# Patient Record
Sex: FEMALE | Race: WHITE | Employment: PART TIME | URBAN - METROPOLITAN AREA
[De-identification: names, ages, dates, MRNs, and addresses within clinical notes are randomized per-mention and may not be internally consistent; named-entity substitution may affect disease eponyms.]

---
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
## 2021-05-28 ENCOUNTER — HOSPITAL ENCOUNTER (OUTPATIENT)
Dept: GENERAL RADIOLOGY | Age: 21
Discharge: HOME | End: 2021-05-28
Payer: MEDICARE

## 2021-05-28 DIAGNOSIS — M79.641 HAND PAIN, RIGHT: ICD-10-CM

## 2021-05-28 PROCEDURE — 73130 X-RAY EXAM OF HAND: CPT

## 2021-06-24 ENCOUNTER — HOSPITAL ENCOUNTER (EMERGENCY)
Age: 21
Discharge: LEFT BEFORE DISCHARGE | End: 2021-06-24
Payer: MEDICARE

## 2021-06-24 VITALS
RESPIRATION RATE: 14 BRPM | OXYGEN SATURATION: 99 % | HEIGHT: 64 IN | TEMPERATURE: 97.6 F | BODY MASS INDEX: 20.14 KG/M2 | WEIGHT: 118 LBS | DIASTOLIC BLOOD PRESSURE: 91 MMHG | HEART RATE: 95 BPM | SYSTOLIC BLOOD PRESSURE: 140 MMHG

## 2021-06-24 LAB
BACTERIA: ABNORMAL /HPF
BILIRUBIN URINE: NEGATIVE
BLOOD, URINE: ABNORMAL
CLARITY: ABNORMAL
COLOR: YELLOW
EPITHELIAL CELLS, UA: ABNORMAL /HPF (ref 0–5)
GLUCOSE URINE: NEGATIVE MG/DL
HCG(URINE) PREGNANCY TEST: NEGATIVE
KETONES, URINE: NEGATIVE MG/DL
LEUKOCYTE ESTERASE, URINE: ABNORMAL
MICROSCOPIC EXAMINATION: YES
NITRITE, URINE: NEGATIVE
PH UA: 7 (ref 5–8)
PROTEIN UA: 100 MG/DL
RBC UA: ABNORMAL /HPF (ref 0–4)
SPECIFIC GRAVITY UA: 1.02 (ref 1–1.03)
URINE REFLEX TO CULTURE: YES
URINE TYPE: ABNORMAL
UROBILINOGEN, URINE: 1 E.U./DL
WBC UA: ABNORMAL /HPF (ref 0–5)

## 2021-06-24 PROCEDURE — 81001 URINALYSIS AUTO W/SCOPE: CPT

## 2021-06-24 PROCEDURE — 87186 SC STD MICRODIL/AGAR DIL: CPT

## 2021-06-24 PROCEDURE — 4500000002 HC ER NO CHARGE

## 2021-06-24 PROCEDURE — 84703 CHORIONIC GONADOTROPIN ASSAY: CPT

## 2021-06-24 PROCEDURE — 87086 URINE CULTURE/COLONY COUNT: CPT

## 2021-06-24 PROCEDURE — 87077 CULTURE AEROBIC IDENTIFY: CPT

## 2021-06-24 ASSESSMENT — PAIN SCALES - GENERAL: PAINLEVEL_OUTOF10: 5

## 2021-06-24 ASSESSMENT — PAIN DESCRIPTION - LOCATION: LOCATION: ABDOMEN

## 2021-06-24 ASSESSMENT — PAIN DESCRIPTION - DESCRIPTORS: DESCRIPTORS: SHARP

## 2021-06-24 ASSESSMENT — PAIN DESCRIPTION - ONSET: ONSET: GRADUAL

## 2021-06-24 ASSESSMENT — PAIN DESCRIPTION - ORIENTATION: ORIENTATION: LOWER

## 2021-06-24 ASSESSMENT — PAIN DESCRIPTION - PAIN TYPE: TYPE: ACUTE PAIN

## 2021-06-24 ASSESSMENT — PAIN DESCRIPTION - FREQUENCY: FREQUENCY: CONTINUOUS

## 2021-06-25 LAB
ORGANISM: ABNORMAL
URINE CULTURE, ROUTINE: ABNORMAL

## 2021-06-26 NOTE — RESULT ENCOUNTER NOTE
Culture reviewed, patient had left without being seen from the ED at [facility], please call and inform patient that her urine was positive for mild infection, recommend starting Macrobid 100 mg twice daily x7 days if she is having any urinary symptoms. Recommend following up with primary care.

## 2023-01-07 ENCOUNTER — APPOINTMENT (OUTPATIENT)
Dept: GENERAL RADIOLOGY | Age: 23
End: 2023-01-07
Payer: MEDICARE

## 2023-01-07 ENCOUNTER — HOSPITAL ENCOUNTER (EMERGENCY)
Age: 23
Discharge: HOME | End: 2023-01-08
Attending: STUDENT IN AN ORGANIZED HEALTH CARE EDUCATION/TRAINING PROGRAM
Payer: MEDICARE

## 2023-01-07 DIAGNOSIS — S61.412A LACERATION OF LEFT HAND WITHOUT FOREIGN BODY, INITIAL ENCOUNTER: ICD-10-CM

## 2023-01-07 DIAGNOSIS — F10.920 ACUTE ALCOHOLIC INTOXICATION WITHOUT COMPLICATION (HCC): ICD-10-CM

## 2023-01-07 DIAGNOSIS — Z72.89 DELIBERATE SELF-CUTTING: Primary | ICD-10-CM

## 2023-01-07 DIAGNOSIS — S61.421A LACERATION OF RIGHT HAND WITH FOREIGN BODY, INITIAL ENCOUNTER: ICD-10-CM

## 2023-01-07 LAB
A/G RATIO: 1.2 (ref 1.1–2.2)
ACETAMINOPHEN LEVEL: <5 UG/ML (ref 10–30)
ALBUMIN SERPL-MCNC: 4.3 G/DL (ref 3.4–5)
ALP BLD-CCNC: 77 U/L (ref 40–129)
ALT SERPL-CCNC: 16 U/L (ref 10–40)
AMPHETAMINE SCREEN, URINE: ABNORMAL
ANION GAP SERPL CALCULATED.3IONS-SCNC: 10 MMOL/L (ref 3–16)
AST SERPL-CCNC: 23 U/L (ref 15–37)
BARBITURATE SCREEN URINE: ABNORMAL
BASOPHILS ABSOLUTE: 0 K/UL (ref 0–0.2)
BASOPHILS RELATIVE PERCENT: 0.1 %
BENZODIAZEPINE SCREEN, URINE: ABNORMAL
BILIRUB SERPL-MCNC: 0.4 MG/DL (ref 0–1)
BUN BLDV-MCNC: 9 MG/DL (ref 7–20)
CALCIUM SERPL-MCNC: 8.1 MG/DL (ref 8.3–10.6)
CANNABINOID SCREEN URINE: POSITIVE
CHLORIDE BLD-SCNC: 103 MMOL/L (ref 99–110)
CO2: 27 MMOL/L (ref 21–32)
COCAINE METABOLITE SCREEN URINE: ABNORMAL
CREAT SERPL-MCNC: <0.5 MG/DL (ref 0.6–1.1)
EOSINOPHILS ABSOLUTE: 0 K/UL (ref 0–0.6)
EOSINOPHILS RELATIVE PERCENT: 0.4 %
ETHANOL: 201 MG/DL (ref 0–0.08)
ETHANOL: 352 MG/DL (ref 0–0.08)
FENTANYL SCREEN, URINE: ABNORMAL
GFR SERPL CREATININE-BSD FRML MDRD: >60 ML/MIN/{1.73_M2}
GLUCOSE BLD-MCNC: 98 MG/DL (ref 70–99)
HCG QUALITATIVE: NEGATIVE
HCT VFR BLD CALC: 43.7 % (ref 36–48)
HEMOGLOBIN: 14.4 G/DL (ref 12–16)
INFLUENZA A: NOT DETECTED
INFLUENZA B: NOT DETECTED
LYMPHOCYTES ABSOLUTE: 1.5 K/UL (ref 1–5.1)
LYMPHOCYTES RELATIVE PERCENT: 14.8 %
Lab: ABNORMAL
MAGNESIUM: 2.2 MG/DL (ref 1.8–2.4)
MCH RBC QN AUTO: 30.9 PG (ref 26–34)
MCHC RBC AUTO-ENTMCNC: 32.9 G/DL (ref 31–36)
MCV RBC AUTO: 93.8 FL (ref 80–100)
METHADONE SCREEN, URINE: ABNORMAL
MONOCYTES ABSOLUTE: 0.6 K/UL (ref 0–1.3)
MONOCYTES RELATIVE PERCENT: 6.2 %
NEUTROPHILS ABSOLUTE: 8.2 K/UL (ref 1.7–7.7)
NEUTROPHILS RELATIVE PERCENT: 78.5 %
OPIATE SCREEN URINE: ABNORMAL
OXYCODONE URINE: ABNORMAL
PDW BLD-RTO: 13.9 % (ref 12.4–15.4)
PH UA: 7
PHENCYCLIDINE SCREEN URINE: ABNORMAL
PLATELET # BLD: 292 K/UL (ref 135–450)
PMV BLD AUTO: 7.3 FL (ref 5–10.5)
POTASSIUM REFLEX MAGNESIUM: 3.5 MMOL/L (ref 3.5–5.1)
RBC # BLD: 4.66 M/UL (ref 4–5.2)
SALICYLATE, SERUM: 0.4 MG/DL (ref 15–30)
SARS-COV-2 RNA, RT PCR: NOT DETECTED
SODIUM BLD-SCNC: 140 MMOL/L (ref 136–145)
TOTAL PROTEIN: 7.9 G/DL (ref 6.4–8.2)
WBC # BLD: 10.4 K/UL (ref 4–11)

## 2023-01-07 PROCEDURE — 80143 DRUG ASSAY ACETAMINOPHEN: CPT

## 2023-01-07 PROCEDURE — 99284 EMERGENCY DEPT VISIT MOD MDM: CPT

## 2023-01-07 PROCEDURE — 84703 CHORIONIC GONADOTROPIN ASSAY: CPT

## 2023-01-07 PROCEDURE — 87636 SARSCOV2 & INF A&B AMP PRB: CPT

## 2023-01-07 PROCEDURE — 83735 ASSAY OF MAGNESIUM: CPT

## 2023-01-07 PROCEDURE — 73130 X-RAY EXAM OF HAND: CPT

## 2023-01-07 PROCEDURE — 82077 ASSAY SPEC XCP UR&BREATH IA: CPT

## 2023-01-07 PROCEDURE — 36415 COLL VENOUS BLD VENIPUNCTURE: CPT

## 2023-01-07 PROCEDURE — 80307 DRUG TEST PRSMV CHEM ANLYZR: CPT

## 2023-01-07 PROCEDURE — 80179 DRUG ASSAY SALICYLATE: CPT

## 2023-01-07 PROCEDURE — 80053 COMPREHEN METABOLIC PANEL: CPT

## 2023-01-07 PROCEDURE — 12001 RPR S/N/AX/GEN/TRNK 2.5CM/<: CPT

## 2023-01-07 PROCEDURE — 85025 COMPLETE CBC W/AUTO DIFF WBC: CPT

## 2023-01-07 RX ORDER — ESCITALOPRAM OXALATE 20 MG/1
TABLET ORAL
COMMUNITY
Start: 2022-12-12

## 2023-01-07 RX ORDER — ESCITALOPRAM OXALATE 10 MG/1
TABLET ORAL
COMMUNITY
Start: 2022-11-22

## 2023-01-07 RX ORDER — HYDROXYZINE HYDROCHLORIDE 10 MG/1
1 TABLET, FILM COATED ORAL
COMMUNITY

## 2023-01-07 NOTE — ED PROVIDER NOTES
[address] ED  EMERGENCY DEPARTMENT ENCOUNTER        Patient Name: Gage Lau  MRN: [id number]  [place] 2000  Date of evaluation: 1/7/2023  Provider: Froilan Nolasco MD  PCP: Igor Charles DO  Note Started: 2:16 AM EST 1/8/23        CHIEF COMPLAINT  Psychiatric Evaluation        HISTORY OF PRESENT ILLNESS  Gage Lau is a 25 y.o. female with a past medical history of cold sores, depression who presents to the ED for psychiatric evaluation. Was breaking glass objects in her home. Was drinking. Someone called EMS. Suicidal ideation: denies, but states that she was trying herself  Previous attempts: yes, OD  Homicidal ideation: denies  Access to firearms: denies  Audiovisual hallucinations: denies  Psychiatric medications: reports compliance  Tobacco use: yes  Alcohol use: occasional  Illicit drug use: denies    Somatic complaints: Patient has numerous small abrasions to the hands from smashing glass. She also has ecchymosis to the bilateral hands. Denies any numbness, weakness, tingling. Tetanus: 2016    Old records reviewed:  Patient saw outpatient [address] provider for anxiety and depression on 12/12/2022    No other complaints, modifying factors or associated symptoms. I have reviewed the following from the nursing documentation. Past Medical History:   Diagnosis Date    Chlamydia     during current pregnancy    Depression     Major depressive disorder- weaned off meds    Headache syndromes 3/3/2010    migraine     History reviewed. No pertinent surgical history. History reviewed. No pertinent family history.   Social History     Socioeconomic History    Marital status: Single     Spouse name: Not on file    Number of children: Not on file    Years of education: Not on file    Highest education level: Not on file   Occupational History    Not on file   Tobacco Use    Smoking status: Never    Smokeless tobacco: Not on file   Vaping Use    Vaping Use: Every day    Devices: Disposable   Substance and Sexual Activity    Alcohol use: Yes     Alcohol/week: 5.0 standard drinks     Types: 5 Shots of liquor per week     Comment: weekly,    Drug use: No    Sexual activity: Never   Other Topics Concern    Not on file   Social History Narrative    ** Merged History Encounter **          Social Determinants of Health     Financial Resource Strain: Not on file   Food Insecurity: Not on file   Transportation Needs: Not on file   Physical Activity: Not on file   Stress: Not on file   Social Connections: Not on file   Intimate Partner Violence: Not on file   Housing Stability: Not on file     Current Facility-Administered Medications   Medication Dose Route Frequency Provider Last Rate Last Admin    tetanus & diphtheria toxoids (adult) 5-2 LFU injection 0.5 mL  0.5 mL IntraMUSCular Prior to discharge Josue Gleason MD         Current Outpatient Medications   Medication Sig Dispense Refill    escitalopram (LEXAPRO) 10 MG tablet       escitalopram (LEXAPRO) 20 MG tablet       hydrOXYzine HCl (ATARAX) 10 MG tablet 1 tablet      ibuprofen (ADVIL;MOTRIN) 800 MG tablet Take 1 tablet by mouth every 6 hours as needed for Pain 30 tablet 0    Prenatal MV-Min-Fe Fum-FA-DHA (PRENATAL 1 PO) Take 1 tablet by mouth daily       No Known Allergies    REVIEW OF SYSTEMS  All systems reviewed, pertinent positives per HPI otherwise noted to be negative. PHYSICAL EXAM  BP (!) 137/106   Pulse (!) 113   Temp 98.8 °F (37.1 °C) (Oral)   Resp 16   SpO2 96%    GENERAL APPEARANCE: Awake and alert. Cooperative. HENT: Normocephalic. Atraumatic. Mucous membranes are moist  NECK: Supple. Full range of motion of the neck without stiffness or pain. EYES: PERRL. EOM's grossly intact. HEART/CHEST: RRR. No murmurs. LUNGS: Respirations unlabored. CTAB. Good air exchange. Speaking comfortably in full sentences. ABDOMEN: No tenderness. Soft. Non-distended. No masses. No organomegaly.  No guarding or rebound. MUSCULOSKELETAL: No extremity edema. Compartments soft. No deformity. No tenderness in the extremities. All extremities neurovascularly intact. Bilateral hands tender to palpation, cardinal hand movements intact. Intact sensation over the radial, ulnar, medial nerve distribution  SKIN: Warm and dry. Healed scratcher's to the bilateral forearms and thighs. Patient has scattered abrasions and small laceration to each hand. She also has scattered bruising. NEUROLOGICAL: Alert and oriented. No gross facial drooping. Strength 5/5, sensation intact. PSYCHIATRIC: Tearful, labile affect does not appear to be responding to internal stimuli. Reports thoughts of harming self, denies HI    LABS  I have reviewed all labs for this visit.    Results for orders placed or performed during the hospital encounter of 01/07/23   COVID-19 & Influenza Combo    Specimen: Nasopharyngeal Swab   Result Value Ref Range    SARS-CoV-2 RNA, RT PCR NOT DETECTED NOT DETECTED    INFLUENZA A NOT DETECTED NOT DETECTED    INFLUENZA B NOT DETECTED NOT DETECTED   CBC with Auto Differential   Result Value Ref Range    WBC 10.4 4.0 - 11.0 K/uL    RBC 4.66 4.00 - 5.20 M/uL    Hemoglobin 14.4 12.0 - 16.0 g/dL    Hematocrit 43.7 36.0 - 48.0 %    MCV 93.8 80.0 - 100.0 fL    MCH 30.9 26.0 - 34.0 pg    MCHC 32.9 31.0 - 36.0 g/dL    RDW 13.9 12.4 - 15.4 %    Platelets 096 289 - 339 K/uL    MPV 7.3 5.0 - 10.5 fL    Neutrophils % 78.5 %    Lymphocytes % 14.8 %    Monocytes % 6.2 %    Eosinophils % 0.4 %    Basophils % 0.1 %    Neutrophils Absolute 8.2 (H) 1.7 - 7.7 K/uL    Lymphocytes Absolute 1.5 1.0 - 5.1 K/uL    Monocytes Absolute 0.6 0.0 - 1.3 K/uL    Eosinophils Absolute 0.0 0.0 - 0.6 K/uL    Basophils Absolute 0.0 0.0 - 0.2 K/uL   CMP w/ Reflex to MG   Result Value Ref Range    Sodium 140 136 - 145 mmol/L    Potassium reflex Magnesium 3.5 3.5 - 5.1 mmol/L    Chloride 103 99 - 110 mmol/L    CO2 27 21 - 32 mmol/L    Anion Gap 10 3 - 16 Glucose 98 70 - 99 mg/dL    BUN 9 7 - 20 mg/dL    Creatinine <0.5 (L) 0.6 - 1.1 mg/dL    Est, Glom Filt Rate >60 >60    Calcium 8.1 (L) 8.3 - 10.6 mg/dL    Total Protein 7.9 6.4 - 8.2 g/dL    Albumin 4.3 3.4 - 5.0 g/dL    Albumin/Globulin Ratio 1.2 1.1 - 2.2    Total Bilirubin 0.4 0.0 - 1.0 mg/dL    Alkaline Phosphatase 77 40 - 129 U/L    ALT 16 10 - 40 U/L    AST 23 15 - 37 U/L   ETOH   Result Value Ref Range    Ethanol Lvl 352 mg/dL   HCG Qualitative, Serum   Result Value Ref Range    hCG Qual Negative Detects HCG level >10 MIU/mL   Acetaminophen Level   Result Value Ref Range    Acetaminophen Level <5 (L) 10 - 30 ug/mL   Salicylate   Result Value Ref Range    Salicylate, Serum 0.4 (L) 15.0 - 30.0 mg/dL   Drug screen multi urine   Result Value Ref Range    Amphetamine Screen, Urine Neg Negative <1000ng/mL    Barbiturate Screen, Ur Neg Negative <200 ng/mL    Benzodiazepine Screen, Urine Neg Negative <200 ng/mL    Cannabinoid Scrn, Ur POSITIVE (A) Negative <50 ng/mL    Cocaine Metabolite Screen, Urine Neg Negative <300 ng/mL    Opiate Scrn, Ur Neg Negative <300 ng/mL    PCP Screen, Urine Neg Negative <25 ng/mL    Methadone Screen, Urine Neg Negative <300 ng/mL    Oxycodone Urine Neg Negative <100 ng/ml    FENTANYL SCREEN, URINE Neg Negative <5 ng/mL    pH, UA 7.0     Drug Screen Comment: see below    Magnesium   Result Value Ref Range    Magnesium 2.20 1.80 - 2.40 mg/dL   Ethanol   Result Value Ref Range    Ethanol Lvl 201 mg/dL       RADIOLOGY  Non-plain film images such as CT, Ultrasound and MRI are read by the radiologist. Plain radiographic images are visualized and preliminarily interpreted by the ED Provider with the below findings:    Bilateral wrist show no evidence of fracture or dislocation.   X-ray of the right hand shows small linear foreign body over the thenar eminence    Interpretation per the Radiologist below, if available at the time of this note:    XR HAND RIGHT (MIN 3 VIEWS)   Final Result No acute fracture or dislocation. Tiny linear radiopaque foreign object is seen in the soft tissues overlying   the thumb. XR HAND LEFT (MIN 3 VIEWS)   Final Result   No acute osseous abnormality. No foreign body identified             Bedside Ultrasound, as interpreted by me, if performed:    No results found. PROCEDURES      Procedure Note - Laceration repair-right hand:  Questions were sought and answered and verbal consent was given by patient for the procedure. The area was prepped and draped in standard bedside fashion. The wound area was anesthetized with 1ml of Lidocaine 1% without epinephrine without added sodium bicarbonate. The wound was explored and cleaned further with No foreign bodies found. The wound was repaired with 4-0 Prolene; 1 simple interrupted sutures were used. The patient tolerated the procedure well without complications and my repeat neurovascular exam post-procedure is unchanged. Wound care and scar minimization education was provided. Instructions were given to return for increasing pain, redness, streaking, discharge, or any other worsening or worrisome concerns. Procedure Note - Laceration repair-left hand:  Questions were sought and answered and verbal consent was given by patient for the procedure. The area was prepped and draped in standard bedside fashion. The wound area was anesthetized with 1ml of Lidocaine 1% without epinephrine without added sodium bicarbonate. The wound was explored with No foreign bodies found. The wound was repaired with 4-0 Prolene; 1 simple interrupted sutures were used. The patient tolerated the procedure well without complications and my repeat neurovascular exam post-procedure is unchanged. Wound care and scar minimization education was provided. Instructions were given to return for increasing pain, redness, streaking, discharge, or any other worsening or worrisome concerns.        EMERGENCY DEPARTMENT COURSE and DIFFERENTIAL DIAGNOSIS/MDM:   Patient seen and evaluated. Old records reviewed and pertinent information included in HPI. Labs and imaging reviewed and results discussed with patient. Overall tearful appearing patient presenting for alcohol intoxication and self-harm behavior. History obtained from patient. Physical exam remarkable for numerous shallow lacerations to the bilateral hands with ecchymoses. Patient also has evidence of deliberate self cutting. Patient's pertinent external medical records: Records Reviewed : Outpatient Notes patient discussed anxiety and depression with outpatient [address] [place] [address] provider on 12/12/2022    Chronic Medical Conditions that may contribute to presentation today:  has a past medical history of Chlamydia, Depression, and Headache syndromes (3/3/2010). Social Determinants affecting Dx or Tx: Social Determinants : None;   Social History     Socioeconomic History    Marital status: Single     Spouse name: Not on file    Number of children: Not on file    Years of education: Not on file    Highest education level: Not on file   Occupational History    Not on file   Tobacco Use    Smoking status: Never    Smokeless tobacco: Not on file   Vaping Use    Vaping Use: Every day    Devices: Disposable   Substance and Sexual Activity    Alcohol use:  Yes     Alcohol/week: 5.0 standard drinks     Types: 5 Shots of liquor per week     Comment: weekly,    Drug use: No    Sexual activity: Never   Other Topics Concern    Not on file   Social History Narrative    ** Merged History Encounter **          Social Determinants of Health     Financial Resource Strain: Not on file   Food Insecurity: Not on file   Transportation Needs: Not on file   Physical Activity: Not on file   Stress: Not on file   Social Connections: Not on file   Intimate Partner Violence: Not on file   Housing Stability: Not on file         WORKUP INTERPRETATION:  ED Course as of 01/08/23 0224   Sat Jan 07, 2023 1759 Ethanol level significantly elevated at 352 [ER]   1800 No leukocytosis, anemia, thrombocytopenia [ER]   1800 No electrolyte abnormalities or evidence of kidney dysfunction [ER]   1800 Liver function testing unremarkable [ER]   1819 Patient is not pregnant [ER]   1819 Urine drug screen positive for cannabinoids, otherwise unremarkable [ER]   1825 COVID and flu swab negative [ER]   1909 Wounds were cleaned, laceration on bilateral hands were. [ER]   2102 Machine to complete acetaminophen and salicylate testing is down at [facility]. Blood sample has been sent to [facility] for testing [ER]   2210 Acetaminophen and salicylate level negative [ER]   2306 Repeat ethanol level is 201. [ER]   2307 Patient is medically clear pending legal sobriety for psychiatric evaluation. [ER]      ED Course User Index  [ER] Lydia Herron MD      Hand x-ray showed no evidence of fracture or dislocation. Did show small linear foreign body in the right hand which I did attempt to clean further to remove prior to laceration repair. Patient will get tetanus update. CONSULTS:   Patient is pending psychiatry social work consultation after sobriety obtained    Is this patient to be included in the SEP-1 Core Measure due to severe sepsis or septic shock? No   Exclusion criteria - the patient is NOT to be included for SEP-1 Core Measure due to:  2+ SIRS criteria are not met      TREATMENT:  During the patient's ED course, the patient was given:  Medications   tetanus & diphtheria toxoids (adult) 5-2 LFU injection 0.5 mL (has no administration in time range)        REASSESSMENT:  On reassessment, patient is calm and cooperative. Patient is aware of plan to achieve legal sobriety prior to psychiatric evaluation. Patient is aware she is on a psychiatric hold and will be evaluated by psychiatry after legal sobriety is obtained. I have performed a medical clearance examination on this patient.   Patient is still pending legal sobriety. Otherwise, it is my opinion that no medical conditions were discovered that would preclude admission to a behavioral health unit or discharge home. I feel that the patient is medically stable for disposition by the behavioral health team at this time. Blood pressure 116/81, pulse 93, temperature 98.7 °F (37.1 °C), temperature source Temporal, resp. rate 16, SpO2 97 %, unknown if currently breastfeeding. FINAL IMPRESSION      1. Deliberate self-cutting    2. Laceration of right hand with foreign body, initial encounter    3. Laceration of left hand without foreign body, initial encounter    4. Acute alcoholic intoxication without complication [facility])          DISPOSITION/PLAN   Disposition: [address] 01/07/2023 10:39:48 PM    Condition: stable       11:48 PM   I have signed out [facility] Emergency Department care to Dr. Jacque Valdovinos. Patient is medically cleared except for legal sobriety to allow for psychiatric evaluation. We discussed the pertinent history, physical exam, completed/pending test results (if applicable) and current treatment plan. Please refer to his/her chart for the patients remaining Emergency Department course and final disposition. I, Dr. Justina Craig, am the primary clinician of record.          Jefferson Morocho MD  01/08/23 Sunny Carlton

## 2023-01-07 NOTE — ED NOTES
Pt to bathroom in [facility] via . RN provided pt c specimen cup. Pt returned cup to RN c sample that was clear and less than lukewarm. DANIEL Jacob RN  01/07/23 [id number]

## 2023-01-08 VITALS
RESPIRATION RATE: 18 BRPM | SYSTOLIC BLOOD PRESSURE: 134 MMHG | HEART RATE: 68 BPM | DIASTOLIC BLOOD PRESSURE: 92 MMHG | OXYGEN SATURATION: 99 % | TEMPERATURE: 98.2 F

## 2023-01-08 LAB — ETHANOL: NORMAL MG/DL (ref 0–0.08)

## 2023-01-08 PROCEDURE — 82077 ASSAY SPEC XCP UR&BREATH IA: CPT

## 2023-01-08 PROCEDURE — 36415 COLL VENOUS BLD VENIPUNCTURE: CPT

## 2023-01-08 NOTE — ED NOTES
Patient resting calmly in treatment room. Respirations easy and even. No signs of distress noted. Will continue to monitor for safety.       Sunil Jackson RN  01/08/23 0144

## 2023-01-08 NOTE — ED NOTES
Patient is currently in treatment room resting. No signs of distress noted. Will continue to monitor for safety.       Ravinder Paci  01/08/23 [id number]

## 2023-01-08 NOTE — DISCHARGE INSTRUCTIONS
The National Suicide and Crisis Hotline Number is 65. You can call, chat, or text this number at any time to access emergency mental health services. The crisis number for [facility] is 894-8750 (SAVE). This crisis line is available 24 hours a day, seven days a week. You are being referred for back to outpatient treatment at [facility] ([facility]). [facility] has offices located in [facility], and [place]. You can reach them at [phone] (Bety/Chris) or [phone] Robi Grewal)    When you go, please bring a photo i.d., proof of residence, proof of household income, insurance cards (if you have them), and this paperwork. Detox Only- (Treatment should be coordinated prior)    1400 [facility]. Josef Cutler 48    Detox Included in [address] [place] [address]. Alis Hull 135, 1648 [facility] 105 Medicaid, will take [facility]. residents on a fee-only basis if no insurance  Inpatient detox for men and women  [facility]- [phone]  ext. [address] [place] [address] Dr  Accepts Medicaid  Inpatient detox for men and women  [facility] [phone]  [address]  Omayra, [address] [facility]  Private and Medicaid  Ambulatory Detox for men  The 83 [facility] [place]- 911 [facility], [place], [facility] [address]  Private Insurance only  Inpatient detox  [place]- [phone]  Nicole Sims, 800 [facility]  Private insurance only, although will accept those with Medicaid aged 18-20  Inpatient detox  [facility]- [phone], [phone] Admissions  555 N [facility] [place] Darrian Sims, [address]  Most forms of Medicaid accepted, plus private insurances  Inpatient detox  The [address] [place] [address]- [phone] or 701 [facility], [facility] [address]  Most insurance accepted and self-pay rates available  Inpatient detox  Chesnee Ehxkjvrs-[phone]  725 [facility] Carmita Morin [facility] 80  Medicaid accepted  Dual diagnosis treatment with medication management  Pregnant women accepted  Provides transportation to facility   Inpatient detox        Pr-[address] 1785- [phone]  1025 [facility] [address], [address]  Accepts Medicaid and other forms of insurance  [address] [facility] [address]  1250 [facility], [address]  Accepts Medicaid and other forms of insurance  [facility] [phone]  [address] [facility] [address]  George Gilbert, [address] [facility] [address]  Accepts Medicaid and other forms of insurance  Karine do Guzmano- [phone] , [phone]  Josesneha 9, 720 [facility]  Accepts Medicaid and other forms of insurance  [facility] 99- [phone]  117 [facility], 103 [facility]  Accepts Medicaid and other forms of insurance- can bring your children  Boris Cutler, 4648 [facility]  Takes Medicaid and Jerlean Jewels. residents on a fee-only scale  Surveyor- [phone]  Sarahi Whitman, [address]  Private insurance only, although will accept those with Medicaid aged 18-20  The [address]  Accepts Medicaid and other forms of insurance- can bring your children  The 83 [facility] Road- [phone], [phone], [phone]  [facility]5 [address]  Most insurances accepted  [facility]- [phone], [phone] Admissions  555 N \[facility]"" Librado Whitman, [address]  Most forms of Medicaid accepted, plus [address]- [phone]  [facility] MID-NOVEMBER 2019)  [address] [place] [address] [address], Marke, [facility] Dr  Some Medicaid and most private insurances accepted  [facility]- [phone], 16501 [facility]. Tomás [facility], [facility]  Medicare and most private insurances accepted - Mental health w/ co-occuring KEM  [employer] [place]  [address], [address]- [phone] [address],Bora RICE, Justyna, 205 [facility]  No cost, work program, gwen based        [address] - [phone]  [address] MujicaRiverview Medical Centerrogen 55  Accepts Medicaid and other forms of insurance  [place]- [phone]  3136 S [facility]. [place], [address] [facility] [address]  Accepts Medicaid and other forms of insurance  [facility]- [phone]  4011 S [facility].  Isa, [address] [facility] [address]  Accepts Medicaid and other forms of insurance  Karine do Guzmano- [phone] , [phone]  Genjaydensneha 9, 720 [facility]  Accepts Medicaid and other forms of insurance  Kyle uCtler, 1758 [facility]  Takes Medicaid and [facility] General Hospitalist. residents on a fee-only scale  The Cedric B 1711- [phone]  [facility] SPECIALTY [facility] [place] [facility] Life and Recovery-  [phone] [place] [phone]  Gwen based and accepts [facility]  Recovery Works-  [phone]  41 [facility] [address], 1025 [facility]  Medicaid and Private insurance accepted  [place]- [phone]  Marques Gutierrez, [address]  Private insurance only, although will accept those with Medicaid aged 20-19  The 83 [facility] [address]- [phone], [phone], [phone]  [facility]5 [facility], [place], [address] [place] [address]  Most insurances accepted  [facility]- [phone], [phone] Admissions  555 N \[facility]\"" Jr Rojas. [address]  Most forms of Medicaid accepted, plus 300 [facility] [address]- [phone]  [facility] MID-NOVEMBER 2019)  79[address] [place] 153, [place], [facility]   Some Medicaid and most private insurances accepted  [facility]- [phone], [address] [place] [address] Alberto England 107, [address]  Medicare and most private insurances accepted - Mental health w/ co-occuring KEM  [facility]- [phone]  [facility]. Dora Cutler [facility] 119  No cost, work program, gwen based  [facility]  [phone]  49 S. 4050 [facility], 1001 [facility]   Gwen based and free  15 Eliza [facility], [address] Based and free       [facility] [phone]  [address]. Lata Copping [address], [address] [place] [address]  Men and women  [facility]endorf [facility] [phone]  1500 [facility]. Beatriz, [facility]  Men and women  [place]- [phone]  1650 [facility]. Jr Thornton. [address] 21  Men and women  [facility]- [phone]  (17794 [facility] 285 2019)  [address] [address], [facility], [address] [place] [address] Dr  Some Medicaid and most private insurances accepted  [facility]- [phone], [phone] Direct Cell  2000 [facility], [facility]  Medicare and most private insurances accepted - Mental health w/ co-occuring KEM  The 83 [facility] [address]- [address]  Dual-diagnosis, men and women  [facility] By Derrick - [phone]  2540 [facility]. [place], [place]. [address]  Men and Women      Outpatient Only    H[facility]P [facility]- [phone]  Yumiko 9 [place], [facility]  Accepts Medicaid and other forms of insurance  [facility]- [phone]  [address]. Lata Copping 120  [facility], [address] [place] [address]  Accepts Medicaid and other forms of insurance, does ambulatory detox  [place]- [phone]  [facility] 473, [place], 66 Tamika [facility] private insurance, [place], assists with Jennie 22- [phone]  ( will guide)  [address] [place] [address].  [address] [place] [address]  Accepts Medicaid and private insurance  Vasiliy Srinivasan [facility] [phone] [address] Franc[address]63, [address]  Private insurance only at this time  [address]  [address] [place], 1648 Audrey Iniguez  Takes Medicaid and Rach Churn. residents on a fee-only scale  [facility] - [phone]  692 S. 309 [facility], 333 [facility] [facility]  1901 Southwest . NISHI. [address] 206 [facility] [address] [facility] [phone]  181 [facility], [place] [address]  Accepts Medicaid, serves T Botanica [employer] and [employer] residents on a sliding fee scale  [facility] [phone]  [address]. Horace García [address]  Tamika Hernandez [address], 727 [facility]  No Medicaid, No Carol Merl or SANDNES, all other private insurances accepted  [facility]- [phone]  [address] Vang Erlinda, [place]  Private insurance only  [place] - [phone]  Mariela [address]. 206 [facility] [address]  Private insurance only, although will accept those with Medicaid aged 18-20  Changes of Heather Medrano - [phone]  Dawna [address] Dr. [facility] [address]  Private insurance only, although will accept those with Medicaid aged 200 [facility] [place] [facility]- [phone] [address]  Men only, no children  [facility]- [phone]  [facility] 81. 79985 [facility]   Women only, no children  4200 Sun N [facility] and [address]- [phone] 3333 [place] [address], [place], [address] [place] [address]  Women only, no children  [place]-  [phone]   [address], [place], 401 [facility]  Men only, no children  [place] [phone]  [facility].  [place], [address] [place] [address]  Separate mens and womens housing, no children   PAX [place]- [phone]  [address] 187[place], 1648 Audrey Iniguez  Men only, no children  [place]- [phone]  Mens- in [facility] 30- in [place], [place] and [place], [place]  [place]- [phone]  Multiple locations in [facility] and womens [facility]- [phone]  [address], [address] 429  Women only, can potentially bring kids  [facility]- [phone]  26 [address], 100 [facility]  Gwen-based facility, separate mens and womens housing, no children  [place]- 41 [facility], 911 [facility]  Women only, gwen-based, trauma-focused, no children  [facility]- [phone]  2216 [address]- [phone]  [address], [place], [address]  gwen based, women only  [facility]-   7933 RUENFAS  KCJPXAHPPO [place] [address]  Men and Women  [place]- [address] [address]   Males only  [place]-   09581 [facility] [address], 411 [facility]  Male veterans only  Substance [address]- [phone]  Women only, may allow MAT      Narcan    Narcan/Naloxone 1 [facility]- [phone]  Yumiko 9 [place], [facility]     Safer Medication Hailey DoeJeff [place] [address]., [facility]  Options include: personal medication lock bags, personal medication lock boxes, Deterra medication disposal bags, and more. Prescription Drop Off Locations    [facility] Police Department- [phone]  09 W. [facility] 49 [address]  [address] [address]  [address] [place] [address]- [phone] [address], [address]  [address] [facility]- [phone]  120 [address]. 350 [facility], [address] [place] [address] [phone]  101 [facility]. Tatiana Long 82  [address] Endy Calderón  [address] [place] [address]. [address] Police Department- [phone]  895 S.  [facility], 901 N [facility]is/[facility]  7500 [facility]- 166 K. [facility], Sandor Cutler, [address]01 [address] [phone]  [facility] , [place], [facility]- [phone]  [address] [place] [address] Averykayley Michael Meetings  www.aacincinnati. org  NA Meetings  RomanticInfo.fr. org  SMART Recovery  www.smartrecovery. org  Celebrate Recovery  http://. crgroups. info/    Peer Support Groups  Held at [facility], although you are not required to be a client   Yumiko 9 [place], [facility]    Traditional Peer Support  Monday 12:30pm-1:30pm  Monday 5pm-6pm  Wednesday 5pm-6pm  Fresh Start- A re-entry focused group  Wednesday 2:30-4PM    Support for the Family    SOLACE (Surviving Our Loss And Continuing Everyday)  Meet 2nd and 4th Wednesday of the month at [facility]  [address] Patton 15 Theletra, [address]    PAL (Parents of Addicted Keturah Marking)  Monday 6:30-8pm VIANCA FERNANDEZ [facility] Room D  307 [facility], 2501 [facility]  Monday 6:30-8pm [facility]  8026 Josestone Ramírez, [address]  Tuesday 6:30pm-8pm [facility] ED Conference Room   [facility] SPECIALTY [facility]. [place], [address]  Tuesday 7-8:30pm [facility] Mother Room  316 [facility] 1878, 2501 [facility]    Al-Anon/Al-Ateen  www. [facility]-anon. org [facility])  PlayDetails.hu. org ([place])    Marlen Morejon. [address]  (Resources are not guaranteed and are fluctuating)  [facility]- [phone]   [address] 600 [facility],Third Floor  Case by case assistance for rent, utilities, deposits, IDs. On line application @ www. [facility]. [address] [place] [address] [phone]  [facility]   Periodically offer $10.00 [employer] cards for food only.   [address] [phone]   [facility] [address]. [address]  Meet @ [facility] on Wednesday evenings 6:45pm. Ask for a deacon, talk with them briefly, go to the service and meet with deacon after the service. Must provide rent or utility information so they can pay directly.                                              Some gift cards available.  [facility]- [phone] ext.[phone]  [place]. Frequently a wide selection in their food pantry. They have given out vouchers in the past for clothing and furniture.   [facility]: [phone]   [facility]. Mostly food and clothing. They also help with back to school and Angelika assistance. Sign up in July and September. They know where the mobile food pantries go.  [facility]- [phone]  [facility] Tw. Area and school district. Leave message. They mostly help with food and clothing. Call for specific times they are open.  [facility]- [phone]   [facility] Tw only. Leave message with name, address and phone number where you can be reached in 1-2 business days.  [facility]- [phone] ext. 12   [place].   Saint [facility]- [phone] Ext. [phone]  [place], [place]. Financial assistance is very limited. Mostly food help. They sometimes deliver food, especially for shut ins. Leave name, address, and phone number.  [facility]- [phone] Ext. 2  [place], [place]. Leave name, number and a brief message. Able to help with rent utilities food and clothing.  [facility] of [facility]- [phone] ext. [phone]  [place]. Leave name,number,address  [facility]- [phone] ext. 5   [place], [place]. Food pantry, limited clothing, furniture vouchers  [facility]- [phone] Ext. 2   [place]. Leave name and number.   Saint Ray[facility] Lawson- [phone]   3 [place], [place].  Help with rent, utilities, food  Saint Bel[facility]ys- [phone]  [facility]. Clothing vouchers, food cards, some assistance getting prescriptions. Help with rent and  utilities. 1301 Evirx [phone]   1691 [facility] 16. [place] [address]  Help with getting State IDs and birth certificates. Call for specific dates and times. All other assistance must go through Conferences or area parishes. Noe [facility]- [phone]   Louise Roseline. Help with rent and utilities BUT ONLY IF YOU HAVE A SHUT OFF NOTICE OR EVICTION. They may help with getting birth certificates and IDS. [place] [employer]- [phone] ext. 3     [email or web address]. At their website click on assistance. Under financial assistance is an application. Application must be complete or it will not be considered. Applications are reviewed on the 15th and 30th of each month. Limit $100.00 per family per year  [facility]- [phone]  Tuesday, Wednesday, Thursday: 10am-1pm. Mostly help with food. Some help with rent, utilities.

## 2023-01-08 NOTE — ED NOTES
Spoke with patient mother who is coming to pick patient up now.       Kasey Heredia RN  01/08/23 6926

## 2023-01-08 NOTE — ED NOTES
Collateral Contact:  Name: Christine Kwong  Phone:[phone]  Relation to Patient: Mother  Last Contact with Patient: Few days ago  Concerns: Writer spoke with mother and mother was vague about circumstances surrounding her daughter. Mother states she knows daughter suffers from depression but other than that she does not know anything about what is going on or the events that have taken place tonight. Mother was given phone number to call back if she had any concerns/questions.        Brit Silverman  01/08/23 0102

## 2023-01-08 NOTE — ED NOTES
Writer spoke with cousin and cousin states if patient was discharged that cousin would stay with patient at her apartment.       Duarte Schmidt  01/08/23 9233

## 2023-01-08 NOTE — ED NOTES
Patient has order to discharge home. Patient mother here to pick her up. Patient given discharge instructions. Patient states she understands as well as mother. Patient getting dressed.       Jazmin Shepard RN  01/08/23 1037
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
## 2023-01-08 NOTE — DISCHARGE INSTR - COC
Continuity of Care Form    Patient Name: Laurel Obrien   :  2000  MRN:  [id number]    Admit date:  2023  Discharge date:  ***    Code Status Order: Prior   Advance Directives:     Admitting Physician:  No admitting provider for patient encounter. PCP: Yanet Rowland DO    Discharging Nurse: [facility] Unit/Room#: B4/B4  Discharging Unit Phone Number: ***    Emergency Contact:   Extended Emergency Contact Information  Primary Emergency Contact: Pedro Dinh  Address: [facility] [place] [address] [place] [address] Alvin Cooperrogen 56 [address] Phone: [phone]  Mobile Phone: [phone]  Relation: Parent  Secondary Emergency Contact: Shraddha Daniel  Address: Tyron Mclaughlin [address] Phone: [phone]  Relation: Parent    Past Surgical History:  History reviewed. No pertinent surgical history. Immunization History:   Immunization History   Administered Date(s) Administered    DTaP 2001, 2001, 2001, 2002, 2006    Hepatitis B 2001, 2001, 2001    Hib, unspecified 2001, 2001, 2001, 2002    MMR 2002, 2006    Meningococcal MCV4P (Menactra) 08/10/2015    Pneumococcal Conjugate 7-valent (Paddy Triston) 2001, 2001, 2001, 2002    Polio IPV (IPOL) 2001, 2001, 2001, 2006    Tdap (Boostrix, Adacel) 2013    Varicella (Varivax) 2002       Active Problems:  Patient Active Problem List   Diagnosis Code    Normal labor O80, Z37.9     (normal spontaneous vaginal delivery) O80    Periodic headache syndrome G43. C0    Vision blurred H53.8    Recurrent cold sores B00.1    Moderate single current episode of major depressive disorder (HCC) F32.1       Isolation/Infection:   Isolation            No Isolation          Patient Infection Status       Infection Onset Added Last Indicated Last Indicated By Review Planned Expiration Resolved Resolved By    None active    Resolved    COVID-19 (Rule Out) 01/07/23 01/07/23 01/07/23 COVID-19 & Influenza Combo (Ordered)   01/07/23 Rule-Out Test Resulted            Nurse Assessment:  Last Vital Signs: BP (!) 134/92   Pulse 68   Temp 98.2 °F (36.8 °C) (Temporal)   Resp 18   SpO2 99%     Last documented pain score (0-10 scale):    Last Weight:   Wt Readings from Last 1 Encounters:   08/20/17 148 lb (67.1 kg) (85 %, Z= 1.05)*     * Growth percentiles are based on [facility] (Girls, 2-20 Years) data. Mental Status:  {IP PT MENTAL STATUS:20030}    IV Access:  { PAOLA IV ACCESS:081502800}    Nursing Mobility/ADLs:  Walking   {CHP DME SMDQ:020970547}  Transfer  {CHP DME FGDY:996138043}  Bathing  {CHP DME DNHM:872671372}  Dressing  {CHP DME QLQO:145962425}  Toileting  {CHP DME ZOGN:826262998}  Feeding  {P DME BRAU:635950208}  Med Admin  {P DME AYMX:325017824}  Med Delivery   {[facility] MED Delivery:743052344}    Wound Care Documentation and Therapy:        Elimination:  Continence: Bowel: {YES / QQ:68539}  Bladder: {YES / ME:46833}  Urinary Catheter: {Urinary Catheter:110686839}   Colostomy/Ileostomy/Ileal Conduit: {YES / ZB:72486}       Date of Last BM: ***  No intake or output data in the 24 hours ending 01/08/23 1004  No intake/output data recorded.     Safety Concerns:     [employer] Safety Concerns:490223798}    Impairments/Disabilities:      [employer] Impairments/Disabilities:877833779}    Nutrition Therapy:  Current Nutrition Therapy:   [employer] Diet List:836614400}    Routes of Feeding: {CHP DME Other Feedings:026296579}  Liquids: {Slp liquid thickness:56866}  Daily Fluid Restriction: {CHP DME Yes amt example:004294934}  Last Modified Barium Swallow with Video (Video Swallowing Test): {Done Not Done HVKT:609910437}    Treatments at the Time of Hospital Discharge:   Respiratory Treatments: ***  Oxygen Therapy:  {Therapy; copd oxygen:99677}  Ventilator:    {MH CC Vent [facility]:873795466}    Rehab Therapies: {THERAPEUTIC INTERVENTION:6524641491}  Weight Bearing Status/Restrictions: {[facility] Weight Bearin}  Other Medical Equipment (for information only, NOT a DME order):  {EQUIPMENT:871555045}  Other Treatments: ***    Patient's personal belongings (please select all that are sent with patient):  {CHP DME Belongings:296615032}    RN SIGNATURE:  {Esignature:589244100}    CASE MANAGEMENT/SOCIAL WORK SECTION    Inpatient Status Date: ***    Readmission Risk Assessment Score:  Readmission Risk              Risk of Unplanned Readmission:  0           Discharging to Facility/ Agency   Name:   Address:  Phone:  Fax:    Dialysis Facility (if applicable)   Name:  Address:  Dialysis Schedule:  Phone:  Fax:    / signature: {Esignature:748069136}    PHYSICIAN SECTION    Prognosis: {Prognosis:6507073524}    Condition at Discharge: [address] [place] [address] Patient Condition:711478868}    Rehab Potential (if transferring to Rehab): {Prognosis:0145814391}    Recommended Labs or Other Treatments After Discharge: ***    Physician Certification: I certify the above information and transfer of Simon Motley  is necessary for the continuing treatment of the diagnosis listed and that she requires {Admit to Appropriate Level of Care:56687} for {GREATER/LESS:415451316} 30 days.      Update Admission H&P: {CHP DME Changes in JLAML:179868654}    PHYSICIAN SIGNATURE:  {Esignature:068780458}

## 2023-01-08 NOTE — ED NOTES
Patient is currently in treatment with [facility] for [facility]. Patient is eating breakfast and is calm and pleasant. Will continue to monitor for safety.      Yessy Sullivan  01/08/23 0565

## 2023-01-08 NOTE — ED NOTES
Level of Care Disposition: Discharge      Patient was seen by ED provider and [facility] AN AFFILIATE OF [facility] staff. The case was presented to psychiatric provider on-call Dr. Mandy Lim. Based on the ED evaluation and information presented to the provider by this nurse , it is the recommendation of the on call psychiatric provider that the patient be discharged from a psychiatric standpoint with the following referrals: Back to [address] completed by patient.        Aletha Arndt RN  01/08/23 8079

## 2023-01-08 NOTE — ED NOTES
Patient is resting in treatment room. Vitals are stable. Will continue to monitor for safety.      Gaurav Re  01/08/23 0375

## 2023-01-08 NOTE — ED NOTES
Patient in room resting. No signs of distress. Will continue to monitor for safety.      Haydee Runner  01/08/23 0292

## 2023-05-15 ENCOUNTER — HOSPITAL ENCOUNTER (INPATIENT)
Age: 23
LOS: 1 days | Discharge: HOME | DRG: 751 | End: 2023-05-17
Attending: STUDENT IN AN ORGANIZED HEALTH CARE EDUCATION/TRAINING PROGRAM | Admitting: INTERNAL MEDICINE
Payer: MEDICAID

## 2023-05-15 DIAGNOSIS — F10.920 ACUTE ALCOHOLIC INTOXICATION WITHOUT COMPLICATION (HCC): ICD-10-CM

## 2023-05-15 DIAGNOSIS — Z76.89 ENCOUNTER FOR PSYCHIATRIC ASSESSMENT: Primary | ICD-10-CM

## 2023-05-15 LAB
ALBUMIN SERPL-MCNC: 4.3 G/DL (ref 3.4–5)
ALBUMIN/GLOB SERPL: 1.4 {RATIO} (ref 1.1–2.2)
ALP SERPL-CCNC: 64 U/L (ref 40–129)
ALT SERPL-CCNC: 13 U/L (ref 10–40)
AMPHETAMINES UR QL SCN>1000 NG/ML: ABNORMAL
ANION GAP SERPL CALCULATED.3IONS-SCNC: 11 MMOL/L (ref 3–16)
APAP SERPL-MCNC: <5 UG/ML (ref 10–30)
AST SERPL-CCNC: 16 U/L (ref 15–37)
BARBITURATES UR QL SCN>200 NG/ML: ABNORMAL
BASOPHILS # BLD: 0 K/UL (ref 0–0.2)
BASOPHILS NFR BLD: 0.2 %
BENZODIAZ UR QL SCN>200 NG/ML: ABNORMAL
BILIRUB SERPL-MCNC: 0.3 MG/DL (ref 0–1)
BUN SERPL-MCNC: 12 MG/DL (ref 7–20)
CALCIUM SERPL-MCNC: 9.1 MG/DL (ref 8.3–10.6)
CANNABINOIDS UR QL SCN>50 NG/ML: POSITIVE
CHLORIDE SERPL-SCNC: 104 MMOL/L (ref 99–110)
CO2 SERPL-SCNC: 29 MMOL/L (ref 21–32)
COCAINE UR QL SCN: ABNORMAL
CREAT SERPL-MCNC: 0.8 MG/DL (ref 0.6–1.1)
DEPRECATED RDW RBC AUTO: 13.4 % (ref 12.4–15.4)
DRUG SCREEN COMMENT UR-IMP: ABNORMAL
EOSINOPHIL # BLD: 0 K/UL (ref 0–0.6)
EOSINOPHIL NFR BLD: 0.2 %
ETHANOLAMINE SERPL-MCNC: 230 MG/DL (ref 0–0.08)
FENTANYL SCREEN, URINE: ABNORMAL
GFR SERPLBLD CREATININE-BSD FMLA CKD-EPI: >60 ML/MIN/{1.73_M2}
GLUCOSE SERPL-MCNC: 93 MG/DL (ref 70–99)
HCG SERPL QL: NEGATIVE
HCT VFR BLD AUTO: 41.6 % (ref 36–48)
HGB BLD-MCNC: 13.7 G/DL (ref 12–16)
LYMPHOCYTES # BLD: 1 K/UL (ref 1–5.1)
LYMPHOCYTES NFR BLD: 20.4 %
MCH RBC QN AUTO: 31.5 PG (ref 26–34)
MCHC RBC AUTO-ENTMCNC: 33 G/DL (ref 31–36)
MCV RBC AUTO: 95.5 FL (ref 80–100)
METHADONE UR QL SCN>300 NG/ML: ABNORMAL
MONOCYTES # BLD: 0.2 K/UL (ref 0–1.3)
MONOCYTES NFR BLD: 4.8 %
NEUTROPHILS # BLD: 3.5 K/UL (ref 1.7–7.7)
NEUTROPHILS NFR BLD: 74.4 %
OPIATES UR QL SCN>300 NG/ML: ABNORMAL
OXYCODONE UR QL SCN: ABNORMAL
PCP UR QL SCN>25 NG/ML: ABNORMAL
PH UR STRIP: 6 [PH]
PLATELET # BLD AUTO: 277 K/UL (ref 135–450)
PMV BLD AUTO: 7.3 FL (ref 5–10.5)
POTASSIUM SERPL-SCNC: 4 MMOL/L (ref 3.5–5.1)
PROT SERPL-MCNC: 7.4 G/DL (ref 6.4–8.2)
RBC # BLD AUTO: 4.36 M/UL (ref 4–5.2)
SALICYLATES SERPL-MCNC: <0.3 MG/DL (ref 15–30)
SODIUM SERPL-SCNC: 144 MMOL/L (ref 136–145)
WBC # BLD AUTO: 4.7 K/UL (ref 4–11)

## 2023-05-15 PROCEDURE — 80179 DRUG ASSAY SALICYLATE: CPT

## 2023-05-15 PROCEDURE — 99285 EMERGENCY DEPT VISIT HI MDM: CPT

## 2023-05-15 PROCEDURE — 80053 COMPREHEN METABOLIC PANEL: CPT

## 2023-05-15 PROCEDURE — 36415 COLL VENOUS BLD VENIPUNCTURE: CPT

## 2023-05-15 PROCEDURE — 85025 COMPLETE CBC W/AUTO DIFF WBC: CPT

## 2023-05-15 PROCEDURE — 82077 ASSAY SPEC XCP UR&BREATH IA: CPT

## 2023-05-15 PROCEDURE — 84703 CHORIONIC GONADOTROPIN ASSAY: CPT

## 2023-05-15 PROCEDURE — 80143 DRUG ASSAY ACETAMINOPHEN: CPT

## 2023-05-15 PROCEDURE — 80307 DRUG TEST PRSMV CHEM ANLYZR: CPT

## 2023-05-15 RX ORDER — 0.9 % SODIUM CHLORIDE 0.9 %
1000 INTRAVENOUS SOLUTION INTRAVENOUS ONCE
Status: DISCONTINUED | OUTPATIENT
Start: 2023-05-15 | End: 2023-05-15

## 2023-05-15 ASSESSMENT — PAIN - FUNCTIONAL ASSESSMENT: PAIN_FUNCTIONAL_ASSESSMENT: NONE - DENIES PAIN

## 2023-05-15 ASSESSMENT — LIFESTYLE VARIABLES
HOW OFTEN DO YOU HAVE A DRINK CONTAINING ALCOHOL: NEVER
HOW MANY STANDARD DRINKS CONTAINING ALCOHOL DO YOU HAVE ON A TYPICAL DAY: PATIENT DOES NOT DRINK

## 2023-05-15 NOTE — ED NOTES
Patient intoxicated with level of 230. Pt told this writer she does not drink a all. After being confronted with being intoxicated, she stated, \"Well I drank a little last night, about 5 hours ago. ... I know maybe 2 am, it was definitely before 11 am.\"  Pt very tired and does not want to talk at  this moment. She did tell this writer that she sees therapist Krystal Menjivar with [facility] monthly. Her PCP prescribes her meds. States she takes lexapro and something else to help her sleep. She denies SI/SA AV. H.  States her mom wanted her to come in because she was sad, due to her BF and her breaking up because he drank too much. Pt does admit to self harm and has multiple superficial lacerations to her RFA. Will allow patient to rest and reevaluate.       Perri Meza RN  05/15/23 [address] [place], RN  05/15/23 0868

## 2023-05-15 NOTE — ED PROVIDER NOTES
<50 ng/mL    Cocaine Metabolite Screen, Urine Neg Negative <300 ng/mL    Opiate Scrn, Ur Neg Negative <300 ng/mL    PCP Screen, Urine Neg Negative <25 ng/mL    Methadone Screen, Urine Neg Negative <300 ng/mL    Oxycodone Urine Neg Negative <100 ng/ml    FENTANYL SCREEN, URINE Neg Negative <5 ng/mL    pH, UA 6.0     Drug Screen Comment: see below          - Patient seen and evaluated in room B2.  26 y.o. female presented for psychiatric evaluation. Patient presented tachycardic but vitals otherwise unremarkable. On exam she had no focal neurologic deficits. She was tachycardic but rhythm was regular. She had clear breath sounds bilaterally. Given history and exam I am concerned for worsening depression although she is not acutely suicidal.  I also considered acute episode of maximiliano in setting of known bipolar disorder. I considered medical reasons however she has no focal neurologic deficits to suggest underlying intracranial abnormality. She has no localizing signs of infection. I considered substance use with urine drug screen and tox pending.    - Patient was placed on telemetry during her ED stay and no malignant dysrhythmia observed. - Pertinent old records reviewed. -Chronic medical conditions include depression and anxiety  -Social determinants include depression, anxiety, increased alcohol use, everyday vaping use. She has no food or housing insecurity. She has access to transportation. She has primary care follow-up  - Patient did not require any medication while in the ED. Upon reassessment, patient remained hemodynamically stable. No further concerns at this time.   - Diagnostic studies reviewed and interpreted by me   - Lab:   CBC with a normal white blood cell count, no evidence of anemia, normal platelets  CMP with normal electrolytes, normal renal function, normal FTs and bilirubin  Serum pregnancy negative  Elevated ethanol level to 230  Urine drug screen positive for cannabinoid
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
## 2023-05-16 PROBLEM — Z76.89 ENCOUNTER FOR PSYCHIATRIC ASSESSMENT: Status: ACTIVE | Noted: 2023-05-16

## 2023-05-16 PROBLEM — F10.920 ACUTE ALCOHOLIC INTOXICATION WITHOUT COMPLICATION (HCC): Status: ACTIVE | Noted: 2023-05-16

## 2023-05-16 PROBLEM — F33.9 MAJOR DEPRESSIVE DISORDER, RECURRENT (HCC): Status: ACTIVE | Noted: 2023-05-16

## 2023-05-16 PROBLEM — F10.20 ALCOHOL USE DISORDER, MODERATE, DEPENDENCE (HCC): Status: ACTIVE | Noted: 2023-05-16

## 2023-05-16 PROBLEM — Z00.00 ENCOUNTER FOR ROUTINE HISTORY AND PHYSICAL EXAMINATION OF ADULT: Status: ACTIVE | Noted: 2023-05-16

## 2023-05-16 PROBLEM — F33.2 SEVERE EPISODE OF RECURRENT MAJOR DEPRESSIVE DISORDER, WITHOUT PSYCHOTIC FEATURES (HCC): Status: ACTIVE | Noted: 2023-05-16

## 2023-05-16 LAB — ETHANOLAMINE SERPL-MCNC: NORMAL MG/DL (ref 0–0.08)

## 2023-05-16 PROCEDURE — 99221 1ST HOSP IP/OBS SF/LOW 40: CPT | Performed by: NURSE PRACTITIONER

## 2023-05-16 PROCEDURE — 6370000000 HC RX 637 (ALT 250 FOR IP)

## 2023-05-16 PROCEDURE — 36415 COLL VENOUS BLD VENIPUNCTURE: CPT

## 2023-05-16 PROCEDURE — 6370000000 HC RX 637 (ALT 250 FOR IP): Performed by: PSYCHIATRY & NEUROLOGY

## 2023-05-16 PROCEDURE — 82077 ASSAY SPEC XCP UR&BREATH IA: CPT

## 2023-05-16 PROCEDURE — 1240000000 HC EMOTIONAL WELLNESS R&B

## 2023-05-16 RX ORDER — DIPHENHYDRAMINE HYDROCHLORIDE 50 MG/ML
50 INJECTION INTRAMUSCULAR; INTRAVENOUS EVERY 4 HOURS PRN
Status: DISCONTINUED | OUTPATIENT
Start: 2023-05-16 | End: 2023-05-16

## 2023-05-16 RX ORDER — HYDROXYZINE 50 MG/1
50 TABLET, FILM COATED ORAL 3 TIMES DAILY PRN
Status: DISCONTINUED | OUTPATIENT
Start: 2023-05-16 | End: 2023-05-17 | Stop reason: HOSPADM

## 2023-05-16 RX ORDER — POLYETHYLENE GLYCOL 3350 17 G
2 POWDER IN PACKET (EA) ORAL
Status: DISCONTINUED | OUTPATIENT
Start: 2023-05-16 | End: 2023-05-17 | Stop reason: HOSPADM

## 2023-05-16 RX ORDER — MAGNESIUM HYDROXIDE/ALUMINUM HYDROXICE/SIMETHICONE 120; 1200; 1200 MG/30ML; MG/30ML; MG/30ML
30 SUSPENSION ORAL EVERY 6 HOURS PRN
Status: DISCONTINUED | OUTPATIENT
Start: 2023-05-16 | End: 2023-05-16

## 2023-05-16 RX ORDER — TRAZODONE HYDROCHLORIDE 50 MG/1
50 TABLET ORAL NIGHTLY PRN
Status: DISCONTINUED | OUTPATIENT
Start: 2023-05-16 | End: 2023-05-17 | Stop reason: HOSPADM

## 2023-05-16 RX ORDER — ACETAMINOPHEN 325 MG/1
650 TABLET ORAL EVERY 4 HOURS PRN
Status: DISCONTINUED | OUTPATIENT
Start: 2023-05-16 | End: 2023-05-17 | Stop reason: HOSPADM

## 2023-05-16 RX ORDER — ESCITALOPRAM OXALATE 10 MG/1
20 TABLET ORAL DAILY
Status: DISCONTINUED | OUTPATIENT
Start: 2023-05-16 | End: 2023-05-17 | Stop reason: HOSPADM

## 2023-05-16 RX ORDER — BUPROPION HYDROCHLORIDE 150 MG/1
150 TABLET ORAL DAILY
Status: DISCONTINUED | OUTPATIENT
Start: 2023-05-16 | End: 2023-05-17 | Stop reason: HOSPADM

## 2023-05-16 RX ORDER — OLANZAPINE 5 MG/1
5 TABLET ORAL EVERY 4 HOURS PRN
Status: DISCONTINUED | OUTPATIENT
Start: 2023-05-16 | End: 2023-05-16

## 2023-05-16 RX ORDER — NICOTINE 21 MG/24HR
1 PATCH, TRANSDERMAL 24 HOURS TRANSDERMAL DAILY
Status: DISCONTINUED | OUTPATIENT
Start: 2023-05-16 | End: 2023-05-17 | Stop reason: HOSPADM

## 2023-05-16 RX ORDER — PRAZOSIN HYDROCHLORIDE 1 MG/1
1 CAPSULE ORAL
Status: ON HOLD | COMMUNITY
Start: 2023-03-13 | End: 2023-05-16

## 2023-05-16 RX ADMIN — NICOTINE POLACRILEX 2 MG: 2 LOZENGE ORAL at 15:24

## 2023-05-16 RX ADMIN — ESCITALOPRAM 20 MG: 10 TABLET, FILM COATED ORAL at 13:59

## 2023-05-16 RX ADMIN — BUPROPION HYDROCHLORIDE 150 MG: 150 TABLET, EXTENDED RELEASE ORAL at 14:00

## 2023-05-16 ASSESSMENT — SLEEP AND FATIGUE QUESTIONNAIRES
DO YOU HAVE DIFFICULTY SLEEPING: YES
SLEEP PATTERN: DIFFICULTY FALLING ASLEEP;DISTURBED/INTERRUPTED SLEEP;DIFFICULTY ARISING;NIGHTMARES/TERRORS
DO YOU USE A SLEEP AID: YES
AVERAGE NUMBER OF SLEEP HOURS: 6
DO YOU HAVE DIFFICULTY SLEEPING: YES
AVERAGE NUMBER OF SLEEP HOURS: 6
DO YOU USE A SLEEP AID: YES
SLEEP PATTERN: DIFFICULTY FALLING ASLEEP;DISTURBED/INTERRUPTED SLEEP

## 2023-05-16 ASSESSMENT — LIFESTYLE VARIABLES
HOW OFTEN DO YOU HAVE A DRINK CONTAINING ALCOHOL: 2-3 TIMES A WEEK
HOW MANY STANDARD DRINKS CONTAINING ALCOHOL DO YOU HAVE ON A TYPICAL DAY: 3 OR 4

## 2023-05-16 ASSESSMENT — PATIENT HEALTH QUESTIONNAIRE - PHQ9: SUM OF ALL RESPONSES TO PHQ QUESTIONS 1-9: 23

## 2023-05-16 NOTE — BH NOTE
Patient wanted the following providers notified of her admission . ..     1) [address] [phone] spw David Plummer - who noted patient chart and will send a message to the PCP      2) [address] Carrie Dumont/therapist) [phone] - left a voice message for KRANTHI BRAVO [facility]

## 2023-05-16 NOTE — BH NOTE
F/C removed at this time. Patient tolerated procedure well. Educated patient on informing staff on when he urinates because if he doesn't urinate w/in 8 hours staff will have to bladder scan him and possibly either straight cath/replace fierro. Patient understood.

## 2023-05-16 NOTE — H&P
reviewed. No pertinent family history. REVIEW OF SYSTEMS:       Constitutional: Negative for fever   HENT: Negative for sore throat   Respiratory: Negative  for dyspnea, cough   Cardiovascular: Negative for chest pain   Gastrointestinal: Negative for vomiting, diarrhea   Genitourinary: Negative for dysuria  Musculoskeletal: Negative for arthralgias   Skin: Negative for rash   Neurological: Negative for syncope   Psychiatric/Behavorial: per psychiatric evaluation    PHYSICAL EXAM:    /88   Pulse 58   Temp 98.2 °F (36.8 °C) (Oral)   Resp 16   Ht 5' 4\" (1.626 m)   Wt 138 lb (62.6 kg)   SpO2 99%   BMI 23.69 kg/m²     Gen: No distress. Alert. Eyes: PERRL. No sclera icterus. No conjunctival injection. ENT: No discharge. Pharynx clear. Neck: No JVD. No Carotid Bruit. Trachea midline. Resp: No accessory muscle use. No crackles. No wheezes. No rhonchi. CV: Regular rate. Regular rhythm. No murmur. No rub. No edema. GI: Non-tender. Non-distended. Normal bowel sounds. Skin: Warm and dry. No nodule on exposed extremities. No rash on exposed extremities. M/S: No cyanosis. No joint deformity. No clubbing. Neuro: Awake. No focal neurologic deficit on exam.  Cranial nerves II through XII intact. Patient is able to ambulate without difficulty. Psych: Per psychiatry team evaluation       CBC:   Recent Labs     05/15/23  1635   WBC 4.7   HGB 13.7   HCT 41.6   MCV 95.5        BMP:   Recent Labs     05/15/23  1635      K 4.0      CO2 29   BUN 12   CREATININE 0.8     LIVER PROFILE:   Recent Labs     05/15/23  1635   AST 16   ALT 13   BILITOT 0.3   ALKPHOS 64     UA:  Recent Labs     05/15/23  1620   PHUR 6.0         ASSESSMENT/PLAN:  Depression  -management per psychiatry    Migraines  -happened more when she was younger  -Tylenol prn    Cannabis use    Pt has no medical complaints at this time.  They were informed that should a medical concern arise during their admission they may

## 2023-05-16 NOTE — ED NOTES
Level of Care Disposition: admit     Patient was seen by ED provider and [facility] AN AFFILIATE OF [facility] staff. The case presented to psychiatric provider on-call JOSE Arora. Based on the ED evaluation and information presented to the provider by Yaritza El it is the recommendation of the on call psychiatric provider that inpatient hospitalization is the least restrictive environment for the patient at this time. The patient will be admitted to the inpatient unit. Admitting provider did not order suicide precautions due to safety factor's of pt having a five years. Provider felt they were not needed at this time.                Insurance Pre certification Authorization: Medicaid [address] [place] [address]

## 2023-05-16 NOTE — PROGRESS NOTES
Behavioral Services  Medicare Certification Upon Admission    I certify that this patient's inpatient psychiatric hospital admission is medically necessary for:    [x] (1) Treatment which could reasonably be expected to improve this patient's condition,       [x] (2) Or for diagnostic study;     AND     [x](2) The inpatient psychiatric services are provided while the individual is under the care of a physician and are included in the individualized plan of care.     Estimated length of stay/service 4-6 days    Plan for post-hospital care incomplete     Electronically signed by Rolan Caballero MD on 5/16/2023 at 1:01 PM

## 2023-05-16 NOTE — ED NOTES
Presenting Problem:Patient presents to [facility]  voluntarily after reported depression symptoms for several months with recent increase in severity 2wks ago. Patient reports self-harming behaviors with superficial lacerations to right forearm--no active bleeding and apparent scabbing. Patient reports a decline in function related to depression including decreased motivation to get out of bed, difficulty completing self-care, and calling off of work more frequently. Patient denies any change to her appetite, but reports change to sleep pattern with difficultly falling asleep and frequent waking--patient reports constant fatigue even with sufficient sleep. Patient reports recent break-up with her boyfriend and reports drinking tonight to help cope with the loss (ETOH 230). Patient reports that she came to hospital because her mother was concerned about her increased depression symptoms. Patient reports passive SI with no plan and reports she would like to be placed on a raft and pushed out into the ocean to be forgotten about. Patient is currently zane for safety, denies hx of suicide attempt. Appearance/Hygiene:  appearance congruent with listed age, ill-appearing, hospital attire, lying in bed, poor grooming, and fair hygiene   Motor Behavior: Patient is lying in bed calmly with purposeful non-aggressive movements. Patient has a steady, even gait with no abnormalities noted. Attitude: cooperative  Affect: depressed affect   Speech: soft  Mood: depressed   Thought Processes: Logical  Perceptions: Absent   Thought content: Patient is concerned about her depression and feels it may be getting worse even with current rx of lexapro. Patient at times has difficulty coping with stressors including financial and social including recent break-up and reports weekly drinking.  Patient is unsure about being admitted to inpatient psychiatry, but feels she may benefit from speaking with a psychiatrist.    Orientation: A&Ox4

## 2023-05-16 NOTE — DISCHARGE INSTRUCTIONS
Advanced Directives:  Patient does not have a surrogate decision maker appointed   Name (if yes): N/A Phone Number: N/A  Patient does not have a psychiatric and/ or medical advanced directive or power of . Patient was offered psychiatric and/ or medical advanced directive or power of  information/completion but declined to complete   Why not? N/A    Discharge Planning is Complete. Discharge Date: 5/17/23  Reason for Hospitalization: Suicidal Ideation  Discharge Diagnosis: Severe episode of recurrent major depressive disorder, without psychotic features ([facility] [address]  Discharging to: home    Your instructions: Your physician here was Sreekanth Henderson MD. If you have any questions please call the unit at [phone] for the adult unit or [phone] for [facility]. Please note that we have a patient family advisory [place] that meets the second Wednesday of January and the second Wednesday of July at [address] [facility] in [place] at [facility]. Department leadership would love for you to attend to give feedback on what we are doing well and areas in which we can improve our patient care. Please come if you are able and feel free to reach out to [address] [place] [address] at [phone] with any questions. The crisis number for [facility] is  ([facility]). This crisis line is available 24 hours a day, seven days a week. This Hotline may be accessed 24 hours a day, 7 days a week. Please follow up with your PCP regarding any pending labs. You are being referred to the Intensive Outpatient Program here at [address] are scheduled for admission orientation on Friday May 19th at 9 am. When you arrive, park in Lot 2 and enter Door 8 for the [employer] and go to Level 2. Follow the signs for Outpatient Behavioral Health.  If you need to reschedule this appointment, please contact the Outpatient  at
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
## 2023-05-16 NOTE — CARE COORDINATION
[facility]  Treatment Team Note  Day 1    Review Date & Time: 0900  5/16/2023    Patient was not in treatment team      Status EXAM:   Mental Status and Behavioral Exam  Normal: No  Level of Assistance: Independent/Self  Facial Expression: Flat, Sad  Affect: Appropriate  Level of Consciousness: Alert  Frequency of Checks: 4 times per hour, close  Mood:Normal: No  Mood: Depressed, Anxious, Sad  Motor Activity:Normal: No  Motor Activity: Decreased  Eye Contact: Good  Observed Behavior: Withdrawn, Friendly, Cooperative  Sexual Misconduct History: Past - no  Preception: [place] to person, [place] to time, [place] to place, [place] to situation  Attention:Normal: Yes  Thought Processes: Other (comment) (wnl)  Thought Content:Normal: Yes  Depression Symptoms: Change in energy level, Feelings of helplessness, Feelings of hopelessess, Impaired concentration, Loss of interest, Sleep disturbance, Isolative  Anxiety Symptoms: Generalized  Ly Symptoms: No problems reported or observed. Hallucinations: None  Delusions: No  Memory:Normal: Yes  Insight and Judgment: No  Insight and Judgment: Poor judgment, Poor insight      Suicide Risk CSSR-S:  1) Within the past month, have you wished you were dead or wished you could go to sleep and not wake up? : No  2) Have you actually had any thoughts of killing yourself? : No  6) Have you ever done anything, started to do anything, or prepared to do anything to end your life?: No      PLAN/TREATMENT RECOMMENDATIONS UPDATE: Patient will take medication as prescribed, eat 75% of meals, attend groups, participate in milieu activities, participate in treatment team and care planning for discharge and follow up. Patient is a Voluntary admission.      Norah Brown RN

## 2023-05-16 NOTE — GROUP NOTE
Group Therapy Note    Date: 5/16/2023    Group Start Time: 1300  Group End Time: 1400  Group Topic: Psychoeducation    [address] MEGAN Coleman        Group Therapy Note    Attendees: 10       Patient's Goal:  to learn and discuss the importance of having a support system and pt's given resources to help develop a healthy support system. Pt's asked to apply to their lives. Notes:  pt participated in group discussion and was able to apply to herself. Status After Intervention:  Improved    Participation Level:  Active Listener and Minimal    Participation Quality: Appropriate and Attentive      Speech:  hesitant      Thought Process/Content: Logical      Affective Functioning: Flat      Mood: depressed      Level of consciousness:  Alert, Oriented x4, and Attentive      Response to Learning: Able to verbalize current knowledge/experience, Able to verbalize/acknowledge new learning, and Progressing to goal      Endings: None Reported    Modes of Intervention: Education, Support, Socialization, Exploration, and Clarifying      Discipline Responsible: /Counselor      Signature:  MEGAN Adler

## 2023-05-16 NOTE — PLAN OF CARE
Problem: Anxiety  Goal: Will report anxiety at manageable levels  Description: INTERVENTIONS:  1. Administer medication as ordered  2. Teach and rehearse alternative coping skills  3. Provide emotional support with 1:1 interaction with staff  Outcome: Progressing     Problem: Depression  Goal: Will be euthymic at discharge  Description: INTERVENTIONS:  1. Administer medication as ordered  2. Provide emotional support via 1:1 interaction with staff  3. Encourage involvement in milieu/groups/activities  4. Monitor for social isolation  Outcome: Progressing     Problem: Behavior  Goal: Pt/Family maintain appropriate behavior and adhere to behavioral management agreement, if implemented  Description: INTERVENTIONS:  1. Assess patient/family's coping skills and  non-compliant behavior (including use of illegal substances)  2. Notify security of behavior or suspected illegal substances which indicate the need for search of the family and/or belongings  3. Encourage verbalization of thoughts and concerns in a socially appropriate manner  4. Utilize positive, consistent limit setting strategies supporting safety of patient, staff and others  5. Encourage participation in the decision making process about the behavioral management agreement  6. If a visitor's behavior poses a threat to safety call refer to organization policy. 7. Initiate consult with , Psychosocial CNS, Spiritual Care as appropriate  Outcome: Progressing     Problem: Sleep Disturbance  Goal: Will exhibit normal sleeping pattern  Description: INTERVENTIONS:  1. Administer medication as ordered  2. Decrease environmental stimuli, including noise, as appropriate  3.  Discourage social isolation and naps during the day  Outcome: Progressing

## 2023-05-16 NOTE — BH NOTE
[facility]  Admission Note     Admission Type:   Admission Type: Voluntary    Reason for admission: per patient \" my family thinks I need help. I've been feeling down, not wanting to do anything, just not been myself\". Addictive Behavior:   Addictive Behavior  In the Past 3 Months, Have You Felt or Has Someone Told You That You Have a Problem With  : None    Medical Problems:   Past Medical History:   Diagnosis Date    Chlamydia     during current pregnancy    Depression     Major depressive disorder- weaned off meds    Headache syndromes 3/3/2010    migraine       Status EXAM:  Mental Status and Behavioral Exam  Normal: Yes  Level of Assistance: Independent/Self  Facial Expression: Flat, Sad, Worried  Affect: Appropriate  Level of Consciousness: Alert  Frequency of Checks: 4 times per hour, close  Mood:Normal: No  Mood: Depressed, Anxious, Helpless, Sad  Motor Activity:Normal: Yes  Eye Contact: Good  Observed Behavior: Withdrawn, Cooperative, Friendly, Guarded  Sexual Misconduct History: Past - no  Preception: [place] to person, [place] to time, [place] to place, [place] to situation  Attention:Normal: Yes  Thought Processes: Unremarkable  Thought Content:Normal: Yes  Depression Symptoms: Change in energy level, Feelings of helplessness, Feelings of hopelessess, Feelings of worthlessness, Isolative, Loss of interest, Sleep disturbance  Anxiety Symptoms: Generalized  Ly Symptoms: No problems reported or observed.   Hallucinations: None  Delusions: No  Memory:Normal: Yes  Insight and Judgment: No  Insight and Judgment: Poor insight, Poor judgment    Tobacco Screening:  Practical Counseling, on admission,  X, if applicable and completed (first 3 are required if patient doesn't refuse):            ( ) Recognizing danger situations (included triggers and roadblocks)                    ( ) Coping skills (new ways to manage stress,relaxation techniques, changing routine, distraction)

## 2023-05-17 VITALS
HEART RATE: 63 BPM | DIASTOLIC BLOOD PRESSURE: 92 MMHG | WEIGHT: 138 LBS | RESPIRATION RATE: 16 BRPM | BODY MASS INDEX: 23.56 KG/M2 | TEMPERATURE: 97.9 F | SYSTOLIC BLOOD PRESSURE: 131 MMHG | OXYGEN SATURATION: 99 % | HEIGHT: 64 IN

## 2023-05-17 PROCEDURE — 5130000000 HC BRIDGE APPOINTMENT

## 2023-05-17 PROCEDURE — 6370000000 HC RX 637 (ALT 250 FOR IP): Performed by: PSYCHIATRY & NEUROLOGY

## 2023-05-17 PROCEDURE — 6370000000 HC RX 637 (ALT 250 FOR IP)

## 2023-05-17 RX ORDER — BUPROPION HYDROCHLORIDE 150 MG/1
150 TABLET ORAL DAILY
Qty: 30 TABLET | Refills: 0 | Status: SHIPPED | OUTPATIENT
Start: 2023-05-18

## 2023-05-17 RX ADMIN — NICOTINE POLACRILEX 2 MG: 2 LOZENGE ORAL at 10:02

## 2023-05-17 RX ADMIN — ESCITALOPRAM 20 MG: 10 TABLET, FILM COATED ORAL at 08:31

## 2023-05-17 RX ADMIN — BUPROPION HYDROCHLORIDE 150 MG: 150 TABLET, EXTENDED RELEASE ORAL at 08:31

## 2023-05-17 NOTE — PLAN OF CARE
Problem: Anxiety  Goal: Will report anxiety at manageable levels  Description: INTERVENTIONS:  1. Administer medication as ordered  2. Teach and rehearse alternative coping skills  3. Provide emotional support with 1:1 interaction with staff  Outcome: Adequate for Discharge     Problem: Depression  Goal: Will be euthymic at discharge  Description: INTERVENTIONS:  1. Administer medication as ordered  2. Provide emotional support via 1:1 interaction with staff  3. Encourage involvement in milieu/groups/activities  4. Monitor for social isolation  Outcome: Adequate for Discharge     Problem: Behavior  Goal: Pt/Family maintain appropriate behavior and adhere to behavioral management agreement, if implemented  Description: INTERVENTIONS:  1. Assess patient/family's coping skills and  non-compliant behavior (including use of illegal substances)  2. Notify security of behavior or suspected illegal substances which indicate the need for search of the family and/or belongings  3. Encourage verbalization of thoughts and concerns in a socially appropriate manner  4. Utilize positive, consistent limit setting strategies supporting safety of patient, staff and others  5. Encourage participation in the decision making process about the behavioral management agreement  6. If a visitor's behavior poses a threat to safety call refer to organization policy. 7. Initiate consult with , Psychosocial CNS, Spiritual Care as appropriate  Outcome: Adequate for Discharge     Problem: Sleep Disturbance  Goal: Will exhibit normal sleeping pattern  Description: INTERVENTIONS:  1. Administer medication as ordered  2. Decrease environmental stimuli, including noise, as appropriate  3.  Discourage social isolation and naps during the day  Outcome: Adequate for Discharge

## 2023-05-17 NOTE — PROGRESS NOTES
Group Therapy Note    Date: 5/16/2023  Start Time: 20:00  End Time:  21:00  Number of Participants: 10    Type of Group: Recreational wrap up    Wellness Binder Information  Module Name:  /  Session Number:  /    Patient's Goal:  coping skills    Notes:  continuing to work on goal    Status After Intervention:  Unchanged    Participation Level:  Active Listener and Interactive    Participation Quality: Appropriate, Attentive, and Sharing      Speech:  pressured      Thought Process/Content: Logical      Affective Functioning: Blunted      Mood: anxious      Level of consciousness:  Alert and Attentive      Response to Learning: Able to change behavior      Endings: None Reported    Modes of Intervention: Socialization and Problem-solving      Discipline Responsible: [address], [employer]      Signature:  Robi Ortiz

## 2023-05-17 NOTE — GROUP NOTE
Group Therapy Note    Date: 5/17/2023    Group Start Time: 1000  Group End Time: 1908  Group Topic: Music Therapy    [facility]        Group Therapy Note    Topic: Socratic Questioning    Group facilitator led discussion of skill-building in asking questions. Processed that questioning is a skill that we learn through demonstration of others or direct education. Group members used song \"Too Many Questions\" to process hindrances in seeking knowledge and information. Explored a system of reflective questioning to challenge negative self-perceptions. Attendees: 13       Notes:  Greg Haskins was present and actively engaged during session. Collaborative in discusison of family influence on coping and styles of questioning. No needs verbalized at conclusion of session. Status After Intervention:  Improved    Participation Level:  Active Listener and Interactive    Participation Quality: Appropriate and Attentive      Speech:  normal      Thought Process/Content: Logical      Affective Functioning: Congruent      Mood: euthymic      Level of consciousness:  Alert and Oriented x4      Response to Learning: Capable of insight and Progressing to goal      Endings: None Reported    Modes of Intervention: Education, Support, Socialization, Exploration, Clarifying, Activity, and Media      Discipline Responsible: Psychoeducational Specialist      Signature:  Maureen Rosenberg MM, MT-BC

## 2023-05-17 NOTE — BH NOTE
Bridge Appointment completed: Reviewed Discharge Instructions with patient. Patient verbalizes understanding and agreement with the discharge plan using the teachback method.      Referral for Outpatient Tobacco Cessation Counseling, upon discharge ( X if applicable and completed):    ( )  Hospital staff assisted patient to call Quit Line or faxed referral                                   during hospitalization                  ( )  Recognizing danger situations (included triggers and roadblocks), if not completed on admission                    ( )  Coping skills (new ways to manage stress, exercise, relaxation techniques, changing routine, distraction), if not completed on admission                                                           ( )  Basic information about quitting (benefits of quitting, techniques in how to quit, available resources, if not completed on admission  ( ) Referral for counseling faxed to [facility]   ( ) Patient refused referral  (x ) Patient refused counseling  (x ) Patient refused smoking cessation medication upon discharge    Vaccinations ( X if applicable and completed):  ( ) Patient states already received influenza vaccine elsewhere  ( ) Patient received influenza vaccine during this hospitalization  (x ) Patient refused influenza vaccine at this time  ( ) Not offered

## 2023-05-17 NOTE — GROUP NOTE
Group Therapy Note    Date: 5/17/2023    Group Start Time: 1100  Group End Time: 2500  Group Topic: Psychoeducation    [facility]    PAUL Randhawa        Group Therapy Note  Clinician introduced the group to the 4 types of social supports and how to build their social supports. Group members were able to discuss their needs on the Maslow's needs and discuss the community supports available. Clinician provided handouts for the members to complete on their own time. Attendees: 13       Patient's Goal:      Notes:  Patient was cooperative and fully engaged in the group discussion and activity. Patient verbalized thoughts and took the handout with them. Status After Intervention:  Improved    Participation Level:  Active Listener and Interactive    Participation Quality: Appropriate, Attentive, Sharing, and Supportive      Speech:  normal      Thought Process/Content: Logical      Affective Functioning: Congruent      Mood: euthymic      Level of consciousness:  Attentive      Response to Learning: Able to verbalize/acknowledge new learning      Endings: None Reported    Modes of Intervention: Education, Support, Exploration, and Activity      Discipline Responsible: /Counselor      Signature:  PAUL Randhawa

## 2023-05-17 NOTE — BH NOTE
[facility]  Discharge Note    Pt discharged with followings belongings:   Dental Appliances: None  Vision - Corrective Lenses: None  Hearing Aid: None  Jewelry: Bracelet  Body Piercings Removed: N/A  Clothing: Pajamas, Socks, Shirt, Jacket/Coat, Footwear, Undergarments  Other Valuables: Other (Comment) (none)   Valuables returned to patient. Patient educated on aftercare instructions: yes  Information faxed to n/a by n/a  at 2:12 PM .Patient verbalize understanding of AVS:  yes      Status EXAM upon discharge:  Mental Status and Behavioral Exam  Normal: No  Level of Assistance: Independent/Self  Facial Expression: Brightened  Affect: Appropriate  Level of Consciousness: Alert  Frequency of Checks: 4 times per hour, close  Mood:Normal: No  Mood: Depressed, Anxious  Motor Activity:Normal: Yes  Motor Activity: Decreased  Eye Contact: Good  Observed Behavior: Cooperative  Sexual Misconduct History: Current - no  Preception: [place] to person, [place] to time, [place] to place  Attention:Normal: Yes  Thought Processes: Circumstantial  Thought Content:Normal: Yes  Depression Symptoms: No problems reported or observed. Anxiety Symptoms: Generalized  Ly Symptoms: No problems reported or observed.   Hallucinations: None (Patient denies)  Delusions: No  Memory:Normal: Yes  Insight and Judgment: No  Insight and Judgment: Poor judgment, Poor insight    Tobacco Screening:  Practical Counseling, on admission,  X, if applicable and completed (first 3 are required if patient doesn't refuse):            ( ) Recognizing danger situations (included triggers and roadblocks)                    ( ) Coping skills (new ways to manage stress,relaxation techniques, changing routine, distraction)                                                           ( ) Basic information about quitting (benefits of quitting, techniques in how to quit, available resources  ( ) Referral for counseling faxed to [facility]

## 2023-05-17 NOTE — PLAN OF CARE
Patient alert and oriented x 3. Patient rates Depression 2/10 and Anxiety 2/10. Patient visible on the milieu talking on the phone. Patient denies SI/HI/A/V/H. Patient doesn't have HS medications scheduled. Patient denies self harm. No c/o's voiced @ present.

## 2023-05-18 ENCOUNTER — FOLLOWUP TELEPHONE ENCOUNTER (OUTPATIENT)
Dept: PSYCHIATRY | Age: 23
End: 2023-05-18

## 2023-05-19 ENCOUNTER — HOSPITAL ENCOUNTER (OUTPATIENT)
Dept: PSYCHIATRY | Age: 23
Setting detail: THERAPIES SERIES
Discharge: HOME | End: 2023-05-19

## 2023-05-19 ASSESSMENT — ANXIETY QUESTIONNAIRES
GAD7 TOTAL SCORE: 15
2. NOT BEING ABLE TO STOP OR CONTROL WORRYING: 2
7. FEELING AFRAID AS IF SOMETHING AWFUL MIGHT HAPPEN: 1
6. BECOMING EASILY ANNOYED OR IRRITABLE: 1
3. WORRYING TOO MUCH ABOUT DIFFERENT THINGS: 3
1. FEELING NERVOUS, ANXIOUS, OR ON EDGE: 3
5. BEING SO RESTLESS THAT IT IS HARD TO SIT STILL: 3
IF YOU CHECKED OFF ANY PROBLEMS ON THIS QUESTIONNAIRE, HOW DIFFICULT HAVE THESE PROBLEMS MADE IT FOR YOU TO DO YOUR WORK, TAKE CARE OF THINGS AT HOME, OR GET ALONG WITH OTHER PEOPLE: EXTREMELY DIFFICULT
4. TROUBLE RELAXING: 2

## 2023-05-19 ASSESSMENT — SLEEP AND FATIGUE QUESTIONNAIRES
AVERAGE NUMBER OF SLEEP HOURS: 6
DO YOU HAVE DIFFICULTY SLEEPING: YES
DO YOU USE A SLEEP AID: YES

## 2023-05-19 ASSESSMENT — PATIENT HEALTH QUESTIONNAIRE - PHQ9: SUM OF ALL RESPONSES TO PHQ QUESTIONS 1-9: 19
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
## 2023-05-22 ENCOUNTER — HOSPITAL ENCOUNTER (OUTPATIENT)
Dept: PSYCHIATRY | Age: 23
Setting detail: THERAPIES SERIES
Discharge: HOME | End: 2023-05-22
Payer: MEDICAID

## 2023-05-22 VITALS
TEMPERATURE: 97.3 F | HEART RATE: 102 BPM | SYSTOLIC BLOOD PRESSURE: 124 MMHG | DIASTOLIC BLOOD PRESSURE: 81 MMHG | OXYGEN SATURATION: 98 % | RESPIRATION RATE: 16 BRPM

## 2023-05-22 DIAGNOSIS — F33.2 SEVERE EPISODE OF RECURRENT MAJOR DEPRESSIVE DISORDER, WITHOUT PSYCHOTIC FEATURES (HCC): Primary | ICD-10-CM

## 2023-05-22 PROCEDURE — 90853 GROUP PSYCHOTHERAPY: CPT

## 2023-05-22 NOTE — GROUP NOTE
Group Therapy Note    Date: 5/22/2023    Group Start Time: 10:00 AM  Group End Time: 11:00 AM  Group Topic: Psychoeducation    AKIKOZ LESLYE Ibarra        Group Therapy Note    Topic: Coping with and Relinquishing Unmet Expectations    Group members discussed expectations for the self, expectations for others, and unmet expectations from others. Discussed 3 ways to respond: 1) curiosity & questioning, 2) double standards, 3) reflecting on effects. Attendees: 7       Notes:  Present, attentive and engaged in collaborative discussions across session. No needs verbalized at conclusion of group process. Verbalized relating to group topic, stating \"Everything. Just everything. It's something that brought me here. \"    Status After Intervention:  Improved    Participation Level:  Active Listener and Interactive    Participation Quality: Appropriate, Attentive, and Sharing      Speech:  normal      Thought Process/Content: Logical      Affective Functioning: Congruent      Mood: euthymic      Level of consciousness:  Alert and Attentive      Response to Learning: Capable of insight and Progressing to goal      Endings: None Reported    Modes of Intervention: Education, Support, Socialization, Exploration, Clarifying, and Problem-solving      Discipline Responsible: Psychoeducational Specialist      Signature:  Samira Montiel, MM, MT-BC

## 2023-05-22 NOTE — BH NOTE
[address]                 [address] [facility] [address]                             PSYCHIATRIC EVALUATION    PATIENT NAME: Leslee Zuñiga                   :        2000  MED REC NO:   [id number]                          ROOM:  ACCOUNT NO:   [de-identified]                           ADMIT DATE: 2023  PROVIDER:     Chino Akers MD      IDENTIFICATION:  This is a domiciled, never , and fully employed  71-year-old with a history of depression, PTSD, and alcohol use, who was  referred to our intensive outpatient program after a brief inpatient  stabilization for thoughts of suicide. SOURCE OF INFORMATION:  The patient. Focused record review. CHIEF COMPLAINT:  \"I'm here for followup, I'm doing better. \"    HISTORY OF PRESENT ILLNESS:  The patient has struggled with symptoms of  depression over the last 6 months including low mood, anhedonia,  amotivation, decreased concentration, self-reproach, feelings of  excessive guilt, trouble sleeping and some thoughts of suicide. This is  in the setting of breakup with her boyfriend. She was admitted to our inpatient program on the  and discharged on  the  on a combination of Lexapro and Wellbutrin, and was referred to  our intensive outpatient program for further stabilization. She reports doing much better since starting our program and Wellbutrin. She says her mood has improved. She is more future oriented,  engaged and optimistic. While she continues with initiation and  maintenance insomnia most of her symptoms of depression have resolved. She has not had thoughts of self-harm or suicide. She is tolerating the  combination of Wellbutrin and Lexapro well. PSYCHIATRIC REVIEW OF SYSTEMS:  No maximiliano or psychosis. Some PTSD    STRESSORS:  Financial.  She has a 11year-old daughter, recent breakup  with boyfriend, does not have her in place.     PSYCHIATRIC HISTORY:  One

## 2023-05-22 NOTE — GROUP NOTE
Group Therapy Note    Date: 5/22/2023    Group Start Time: 11:15 AM  Group End Time: 12:15 PM  Group Topic: Group Therapy    AKIKOZ LESLYE Ibarra        Group Therapy Note    Topic: Integrating Thinking/Feeling brains: Bilateral Stimulation, Bilateral Drawing and Self-Regulation    Attendees: 7       Notes: While describing her bilateral drawing, Enrique Spicer acknowledged that her drawing did not depict her creative tendencies, stating that the tree she bob was similar to her drawing style at age 12. Group assisted Enrique Spicer in processing regressive tendencies in trauma response and creating space to learn skills and integrate them into adult perspectives. Status After Intervention:  Improved    Participation Level:  Active Listener and Interactive    Participation Quality: Appropriate and Attentive      Speech:  normal      Thought Process/Content: Logical      Affective Functioning: Congruent      Mood: euthymic      Level of consciousness:  Alert and Oriented x4      Response to Learning: Capable of insight and Progressing to goal      Endings: None Reported    Modes of Intervention: Support, Socialization, Exploration, Activity, and Media      Discipline Responsible: Psychoeducational Specialist      Signature:  Annie Mane, MM, MT-BC

## 2023-05-22 NOTE — GROUP NOTE
Group Therapy Note    Date: 5/22/2023    Group Start Time:  8:30 AM  Group End Time:  9:45 AM  Group Topic: Psychotherapy    [facility]    PAUL Marion        Group Therapy Note  Group members engaged in psychotherapy agenda group. Group members identified their individual agendas/goals and utilized the duration of group time to process, give, and receive feedback related to progress of goals. Focus was placed on remaining in the here and now in order to apply feedback learned in group to actual life circumstances and situations. Attendees: 7       Patient's Goal:  Patient shared her presenting problem with the group and the goal of struggling to regulate her emotions. Notes:  Patient was open with the group members and shared freely with them. She received a lot of feedback and positive statements from the group members. She was able to accept the validation and stated that she has never shared in a group before and it was a lot different than she expected. Group members continued to put her mind at ease and normalize the experience. Patient shared at the end of the group that she felt \"blessed. \"    Status After Intervention:  Improved    Participation Level:  Active Listener and Interactive    Participation Quality: Appropriate, Attentive, Sharing, and Supportive      Speech:  normal      Thought Process/Content: Logical      Affective Functioning: Congruent      Mood: anxious and fearful      Level of consciousness:  Attentive      Response to Learning: Capable of insight      Endings: None Reported    Modes of Intervention: Support, Exploration, and Clarifying      Discipline Responsible: /Counselor      Signature:  PAUL Marion

## 2023-05-24 ENCOUNTER — HOSPITAL ENCOUNTER (OUTPATIENT)
Dept: PSYCHIATRY | Age: 23
Setting detail: THERAPIES SERIES
Discharge: HOME | End: 2023-05-24
Payer: MEDICAID

## 2023-05-24 NOTE — BH NOTE
Pt absent from IOP today. This is pt's first absence. Writer was able to speak to pt's mother/emergency contact who states pt had a migraine upon waking this morning, took medicine for it, and went back to bed.

## 2023-05-24 NOTE — BH NOTE
Outpatient Treatment Team Progress Note  Date of Review: 5/23/2023      Multidisciplinary Outpatient Treatment Team met to discuss and review patient's progress in group and individual therapy sessions. 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     Treatment Start Date: 5/22/2023    Treatment Step-Down Date: n/a    Tentative Discharge Date: 7/3/2023

## 2023-05-26 ENCOUNTER — HOSPITAL ENCOUNTER (OUTPATIENT)
Dept: PSYCHIATRY | Age: 23
Setting detail: THERAPIES SERIES
Discharge: HOME | End: 2023-05-26
Payer: MEDICAID

## 2023-05-26 DIAGNOSIS — F33.2 SEVERE EPISODE OF RECURRENT MAJOR DEPRESSIVE DISORDER, WITHOUT PSYCHOTIC FEATURES (HCC): Primary | ICD-10-CM

## 2023-05-26 PROCEDURE — 90853 GROUP PSYCHOTHERAPY: CPT

## 2023-05-26 NOTE — GROUP NOTE
Group Therapy Note    Date: 5/26/2023    Group Start Time:  8:30 AM  Group End Time:  9:45 AM  Group Topic: Psychotherapy    PAUL Mcneal        Group Therapy Note  Group members engaged in psychotherapy agenda group. Group members identified their individual agendas/goals and utilized the duration of group time to process, give, and receive feedback related to progress of goals. Focus was placed on remaining in the here and now in order to apply feedback learned in group to actual life circumstances and situations. Attendees: 8       Patient's Goal:  Patient reported \"she wants to increase her coping skills because the ones she learned Wednesday she implemented and they worked for her. \"    Notes:  Patient shared that she spent time with family and got a tattoo making her feel good. She shared that their parents made comments her entire life that made her feel unworthy of love. She was told that \"she doesn't deserve anything so she should be thankful for anything she gets. \" Group members identified with parents making similar statements: \"stop crying or I will give you something to cry about invalidating their feelings in the moment\". .. \"Stop crying or I will pull over and leave you in a ditch for the vultures to find. \" Group members helped patient tear down the wall that she is unworthy of love to she is deserving of love. Patient discussed she never said these things out loud and it felt good to get it out. Status After Intervention:  Improved    Participation Level:  Active Listener and Interactive    Participation Quality: Appropriate, Attentive, Sharing, and Supportive      Speech:  normal      Thought Process/Content: Logical      Affective Functioning: Congruent      Mood: dysphoric      Level of consciousness:  Attentive      Response to Learning: Able to verbalize/acknowledge new learning      Endings: None

## 2023-05-26 NOTE — GROUP NOTE
Group Therapy Note    Date: 5/26/2023    Group Start Time: 11:15 AM  Group End Time: 12:15 PM  Group Topic: Psychoeducation    [facility]    [place] Wardx        Group Therapy Note    Topic: Grounding Skills, Mindfulness Practice    Mode of Intervention: Active/Collaborative Practice    Group facilitator provided review of grounding skills in 3 categories: physical, mental, and soothing. Group members discussed their historical preferences for utilizing grounding skills, techniques found most and least effective, discussing the importance of challenging \"one-size-fits-all\" mentality in coping and self-regulation. Group members collaborated in practice of multiple grounding skills from each of the 3 categories. Set intentions for use of grounding skills through long weekend until return to group programming 05/31. Attendees: 8       Notes:  Pt present and actively engaged across collaborative practice of grounding and mindfulness techniques. Voiced understanding of information and resources provided. Articulated preferences for use in various situations and relationships. No needs verbalized at conclusion of group process. Status After Intervention:  Improved    Participation Level:  Active Listener and Interactive    Participation Quality: Appropriate, Attentive, Sharing, and Supportive      Speech:  normal      Thought Process/Content: Logical      Affective Functioning: Congruent      Mood: euthymic      Level of consciousness:  Alert and Oriented x4      Response to Learning: Capable of insight and Progressing to goal      Endings: None Reported    Modes of Intervention: Education, Support, Socialization, Exploration, Clarifying, Activity, and Media      Discipline Responsible: Psychoeducational Specialist      Signature:  Samira Montiel, MM, MT-BC

## 2023-05-26 NOTE — GROUP NOTE
Group Therapy Note    Date: 5/26/2023    Group Start Time: 10:00 AM  Group End Time: 11:00 AM  Group Topic: Psychoeducation    [facility]    Yasmin Armstrong        Group Therapy Note    Attendees: 8    Facilitator led a psychoeducation group on self-compassion. Patients completed self-assessments that explored how patients typically act towards themselves in difficult times. The assessments looked at areas of self-kindness, self-judgement, common humanity, isolation, mindfulness, and over-identification. Patients then explored ways to develop behaviors that fall under self-compassion and improve how they choose to engage with themselves. Patients identified a behavior they would like to develop and created a smart goal for engaging in that behavior. Notes:  Melissa Haley was engaged in the group discussion and completed the self-assessment. Melissa Haley successful identified a behavior he would like to work on for building self-compassion and created a goal that met all SMART criteria. Status After Intervention:  Improved    Participation Level:  Active Listener and Interactive    Participation Quality: Appropriate, Attentive, Sharing, and Supportive      Speech:  normal      Thought Process/Content: Logical  Linear      Affective Functioning: Congruent      Mood: euthymic      Level of consciousness:  Alert, Oriented x4, and Attentive      Response to Learning: Able to verbalize current knowledge/experience, Able to verbalize/acknowledge new learning, Able to retain information, Capable of insight, Able to change behavior, and Progressing to goal      Endings: None Reported    Modes of Intervention: Education and Exploration      Discipline Responsible: /Counselor      Signature:  LAYNE Rojas

## 2023-05-31 ENCOUNTER — APPOINTMENT (OUTPATIENT)
Dept: GENERAL RADIOLOGY | Age: 23
End: 2023-05-31
Payer: MEDICAID

## 2023-05-31 ENCOUNTER — HOSPITAL ENCOUNTER (EMERGENCY)
Age: 23
Discharge: HOME | End: 2023-06-01
Attending: EMERGENCY MEDICINE
Payer: MEDICAID

## 2023-05-31 DIAGNOSIS — F10.920 ACUTE ALCOHOLIC INTOXICATION WITHOUT COMPLICATION (HCC): Primary | ICD-10-CM

## 2023-05-31 DIAGNOSIS — F33.1 MODERATE EPISODE OF RECURRENT MAJOR DEPRESSIVE DISORDER (HCC): ICD-10-CM

## 2023-05-31 LAB
AMPHETAMINES UR QL SCN>1000 NG/ML: ABNORMAL
BARBITURATES UR QL SCN>200 NG/ML: ABNORMAL
BASOPHILS # BLD: 0 K/UL (ref 0–0.2)
BASOPHILS NFR BLD: 0.2 %
BENZODIAZ UR QL SCN>200 NG/ML: ABNORMAL
BILIRUB UR QL STRIP.AUTO: NEGATIVE
CANNABINOIDS UR QL SCN>50 NG/ML: POSITIVE
CLARITY UR: CLEAR
COCAINE UR QL SCN: ABNORMAL
COLOR UR: YELLOW
DEPRECATED RDW RBC AUTO: 13.9 % (ref 12.4–15.4)
DRUG SCREEN COMMENT UR-IMP: ABNORMAL
EOSINOPHIL # BLD: 0 K/UL (ref 0–0.6)
EOSINOPHIL NFR BLD: 0.1 %
FENTANYL SCREEN, URINE: ABNORMAL
GLUCOSE UR STRIP.AUTO-MCNC: NEGATIVE MG/DL
HCG SERPL QL: NEGATIVE
HCT VFR BLD AUTO: 44.4 % (ref 36–48)
HGB BLD-MCNC: 15.2 G/DL (ref 12–16)
HGB UR QL STRIP.AUTO: NEGATIVE
KETONES UR STRIP.AUTO-MCNC: NEGATIVE MG/DL
LEUKOCYTE ESTERASE UR QL STRIP.AUTO: NEGATIVE
LYMPHOCYTES # BLD: 1.8 K/UL (ref 1–5.1)
LYMPHOCYTES NFR BLD: 21.9 %
MCH RBC QN AUTO: 32.3 PG (ref 26–34)
MCHC RBC AUTO-ENTMCNC: 34.1 G/DL (ref 31–36)
MCV RBC AUTO: 94.6 FL (ref 80–100)
METHADONE UR QL SCN>300 NG/ML: ABNORMAL
MONOCYTES # BLD: 0.6 K/UL (ref 0–1.3)
MONOCYTES NFR BLD: 6.7 %
NEUTROPHILS # BLD: 5.9 K/UL (ref 1.7–7.7)
NEUTROPHILS NFR BLD: 71.1 %
NITRITE UR QL STRIP.AUTO: NEGATIVE
OPIATES UR QL SCN>300 NG/ML: ABNORMAL
OXYCODONE UR QL SCN: ABNORMAL
PCP UR QL SCN>25 NG/ML: ABNORMAL
PH UR STRIP.AUTO: 7 [PH] (ref 5–8)
PH UR STRIP: 7 [PH]
PLATELET # BLD AUTO: 296 K/UL (ref 135–450)
PMV BLD AUTO: 7.4 FL (ref 5–10.5)
PROT UR STRIP.AUTO-MCNC: NEGATIVE MG/DL
RBC # BLD AUTO: 4.7 M/UL (ref 4–5.2)
SP GR UR STRIP.AUTO: <=1.005 (ref 1–1.03)
UA COMPLETE W REFLEX CULTURE PNL UR: NORMAL
UA DIPSTICK W REFLEX MICRO PNL UR: NORMAL
URN SPEC COLLECT METH UR: NORMAL
UROBILINOGEN UR STRIP-ACNC: 0.2 E.U./DL
WBC # BLD AUTO: 8.4 K/UL (ref 4–11)

## 2023-05-31 PROCEDURE — 73610 X-RAY EXAM OF ANKLE: CPT

## 2023-05-31 PROCEDURE — 80053 COMPREHEN METABOLIC PANEL: CPT

## 2023-05-31 PROCEDURE — 81003 URINALYSIS AUTO W/O SCOPE: CPT

## 2023-05-31 PROCEDURE — 96375 TX/PRO/DX INJ NEW DRUG ADDON: CPT

## 2023-05-31 PROCEDURE — 93005 ELECTROCARDIOGRAM TRACING: CPT | Performed by: EMERGENCY MEDICINE

## 2023-05-31 PROCEDURE — 99285 EMERGENCY DEPT VISIT HI MDM: CPT

## 2023-05-31 PROCEDURE — 80143 DRUG ASSAY ACETAMINOPHEN: CPT

## 2023-05-31 PROCEDURE — 82077 ASSAY SPEC XCP UR&BREATH IA: CPT

## 2023-05-31 PROCEDURE — 85025 COMPLETE CBC W/AUTO DIFF WBC: CPT

## 2023-05-31 PROCEDURE — 96374 THER/PROPH/DIAG INJ IV PUSH: CPT

## 2023-05-31 PROCEDURE — 80179 DRUG ASSAY SALICYLATE: CPT

## 2023-05-31 PROCEDURE — 84703 CHORIONIC GONADOTROPIN ASSAY: CPT

## 2023-05-31 PROCEDURE — 6360000002 HC RX W HCPCS: Performed by: EMERGENCY MEDICINE

## 2023-05-31 PROCEDURE — 80307 DRUG TEST PRSMV CHEM ANLYZR: CPT

## 2023-05-31 PROCEDURE — 2580000003 HC RX 258: Performed by: EMERGENCY MEDICINE

## 2023-05-31 PROCEDURE — 36415 COLL VENOUS BLD VENIPUNCTURE: CPT

## 2023-05-31 RX ORDER — ONDANSETRON 2 MG/ML
4 INJECTION INTRAMUSCULAR; INTRAVENOUS ONCE
Status: COMPLETED | OUTPATIENT
Start: 2023-05-31 | End: 2023-05-31

## 2023-05-31 RX ORDER — KETOROLAC TROMETHAMINE 30 MG/ML
30 INJECTION, SOLUTION INTRAMUSCULAR; INTRAVENOUS ONCE
Status: COMPLETED | OUTPATIENT
Start: 2023-05-31 | End: 2023-05-31

## 2023-05-31 RX ORDER — 0.9 % SODIUM CHLORIDE 0.9 %
1000 INTRAVENOUS SOLUTION INTRAVENOUS ONCE
Status: COMPLETED | OUTPATIENT
Start: 2023-05-31 | End: 2023-06-01

## 2023-05-31 RX ORDER — PRAZOSIN HYDROCHLORIDE 1 MG/1
CAPSULE ORAL
COMMUNITY
Start: 2023-03-13

## 2023-05-31 RX ADMIN — SODIUM CHLORIDE 1000 ML: 9 INJECTION, SOLUTION INTRAVENOUS at 23:20

## 2023-05-31 RX ADMIN — ONDANSETRON 4 MG: 2 INJECTION INTRAMUSCULAR; INTRAVENOUS at 23:32

## 2023-05-31 RX ADMIN — KETOROLAC TROMETHAMINE 30 MG: 30 INJECTION, SOLUTION INTRAMUSCULAR; INTRAVENOUS at 23:32

## 2023-05-31 ASSESSMENT — PAIN DESCRIPTION - ORIENTATION
ORIENTATION: RIGHT
ORIENTATION: MID;LEFT

## 2023-05-31 ASSESSMENT — PAIN SCALES - GENERAL
PAINLEVEL_OUTOF10: 7
PAINLEVEL_OUTOF10: 6

## 2023-05-31 ASSESSMENT — PAIN DESCRIPTION - DESCRIPTORS
DESCRIPTORS: DISCOMFORT
DESCRIPTORS: DISCOMFORT

## 2023-05-31 ASSESSMENT — PAIN DESCRIPTION - LOCATION
LOCATION: ANKLE
LOCATION: CHEST

## 2023-05-31 ASSESSMENT — PAIN - FUNCTIONAL ASSESSMENT: PAIN_FUNCTIONAL_ASSESSMENT: 0-10

## 2023-06-01 VITALS
WEIGHT: 133.8 LBS | HEART RATE: 92 BPM | HEIGHT: 64 IN | TEMPERATURE: 98.3 F | RESPIRATION RATE: 16 BRPM | DIASTOLIC BLOOD PRESSURE: 73 MMHG | BODY MASS INDEX: 22.84 KG/M2 | OXYGEN SATURATION: 99 % | SYSTOLIC BLOOD PRESSURE: 112 MMHG

## 2023-06-01 LAB
ALBUMIN SERPL-MCNC: 4.6 G/DL (ref 3.4–5)
ALBUMIN/GLOB SERPL: 1.2 {RATIO} (ref 1.1–2.2)
ALP SERPL-CCNC: 61 U/L (ref 40–129)
ALT SERPL-CCNC: 16 U/L (ref 10–40)
ANION GAP SERPL CALCULATED.3IONS-SCNC: 14 MMOL/L (ref 3–16)
APAP SERPL-MCNC: <5 UG/ML (ref 10–30)
AST SERPL-CCNC: 20 U/L (ref 15–37)
BILIRUB SERPL-MCNC: 0.4 MG/DL (ref 0–1)
BUN SERPL-MCNC: 9 MG/DL (ref 7–20)
CALCIUM SERPL-MCNC: 9.5 MG/DL (ref 8.3–10.6)
CHLORIDE SERPL-SCNC: 105 MMOL/L (ref 99–110)
CO2 SERPL-SCNC: 28 MMOL/L (ref 21–32)
CREAT SERPL-MCNC: 0.7 MG/DL (ref 0.6–1.1)
EKG ATRIAL RATE: 99 BPM
EKG DIAGNOSIS: NORMAL
EKG P AXIS: 61 DEGREES
EKG P-R INTERVAL: 146 MS
EKG Q-T INTERVAL: 358 MS
EKG QRS DURATION: 80 MS
EKG QTC CALCULATION (BAZETT): 459 MS
EKG R AXIS: 66 DEGREES
EKG T AXIS: 49 DEGREES
EKG VENTRICULAR RATE: 99 BPM
ETHANOLAMINE SERPL-MCNC: 263 MG/DL (ref 0–0.08)
GFR SERPLBLD CREATININE-BSD FMLA CKD-EPI: >60 ML/MIN/{1.73_M2}
GLUCOSE SERPL-MCNC: 90 MG/DL (ref 70–99)
POTASSIUM SERPL-SCNC: 3.9 MMOL/L (ref 3.5–5.1)
PROT SERPL-MCNC: 8.6 G/DL (ref 6.4–8.2)
SALICYLATES SERPL-MCNC: <0.3 MG/DL (ref 15–30)
SODIUM SERPL-SCNC: 147 MMOL/L (ref 136–145)

## 2023-06-01 PROCEDURE — 93010 ELECTROCARDIOGRAM REPORT: CPT | Performed by: INTERNAL MEDICINE

## 2023-06-01 ASSESSMENT — PAIN - FUNCTIONAL ASSESSMENT: PAIN_FUNCTIONAL_ASSESSMENT: 0-10

## 2023-06-01 ASSESSMENT — ENCOUNTER SYMPTOMS
SHORTNESS OF BREATH: 0
VOMITING: 0
ABDOMINAL PAIN: 0
BACK PAIN: 0
COUGH: 0
DIARRHEA: 0
CHEST TIGHTNESS: 0
RHINORRHEA: 0

## 2023-06-01 ASSESSMENT — PAIN DESCRIPTION - ORIENTATION: ORIENTATION: RIGHT

## 2023-06-01 ASSESSMENT — PAIN DESCRIPTION - LOCATION: LOCATION: ANKLE

## 2023-06-01 ASSESSMENT — PAIN SCALES - GENERAL: PAINLEVEL_OUTOF10: 5

## 2023-06-01 NOTE — ED PROVIDER NOTES
punctuation, and spelling, as well as words and phrases that may be inappropriate. If there are any questions or concerns please feel free to contact the dictating provider for clarification.      KEYONA JENNINGS DO (electronically signed)   [address] [facility],   06/01/23 2812

## 2023-06-02 ENCOUNTER — HOSPITAL ENCOUNTER (OUTPATIENT)
Dept: PSYCHIATRY | Age: 23
Setting detail: THERAPIES SERIES
Discharge: HOME | End: 2023-06-02
Payer: MEDICAID

## 2023-06-02 VITALS
SYSTOLIC BLOOD PRESSURE: 132 MMHG | DIASTOLIC BLOOD PRESSURE: 96 MMHG | HEART RATE: 82 BPM | OXYGEN SATURATION: 97 % | TEMPERATURE: 97.8 F | RESPIRATION RATE: 16 BRPM

## 2023-06-02 DIAGNOSIS — F33.2 SEVERE EPISODE OF RECURRENT MAJOR DEPRESSIVE DISORDER, WITHOUT PSYCHOTIC FEATURES (HCC): Primary | ICD-10-CM

## 2023-06-02 PROCEDURE — 90853 GROUP PSYCHOTHERAPY: CPT

## 2023-06-02 RX ORDER — CLONAZEPAM 0.5 MG/1
0.25 TABLET ORAL NIGHTLY PRN
Qty: 5 TABLET | Refills: 0 | Status: SHIPPED | OUTPATIENT
Start: 2023-06-02 | End: 2023-06-09

## 2023-06-02 NOTE — GROUP NOTE
Group Therapy Note    Date: 6/2/2023    Group Start Time:  8:30 AM  Group End Time:  9:45 AM  Group Topic: Psychotherapy    [facility]    PAUL Mojica        Group Therapy Note  Group members engaged in psychotherapy agenda group. Group members identified their individual agendas/goals and utilized the duration of group time to process, give, and receive feedback related to progress of goals. Focus was placed on remaining in the here and now in order to apply feedback learned in group to actual life circumstances and situations. Attendees: 10       Patient's Goal:  Patient Alysas Median that she reconnected with a friend from middle school and she is rebuilding her support group. \"    Notes:  Patient shared that she is working to break down her walls to let people in for the support. She stated that she is not just letting anyone in, they must be potentially able to be trusted. She reconnected with the friend from [employer] because they were supported back then and have the potential to be trusted. Group members discussed being hurt and the hardship of learning to trust again. The members were supportive and validating with their responses and warnings. Patient was able to accept their feedback. Status After Intervention:  Improved    Participation Level:  Active Listener and Interactive    Participation Quality: Appropriate, Attentive, Sharing, and Supportive      Speech:  normal      Thought Process/Content: Logical      Affective Functioning: Congruent      Mood: euthymic      Level of consciousness:  Attentive      Response to Learning: Able to verbalize/acknowledge new learning      Endings: None Reported    Modes of Intervention: Support, Exploration, and Clarifying      Discipline Responsible: /Counselor      Signature:  PAUL Mojica

## 2023-06-02 NOTE — GROUP NOTE
Group Therapy Note    Date: 6/2/2023    Group Start Time: 11:15 AM  Group End Time: 12:15 PM  Group Topic: Recreational    [facility]    Rey Ibarra        Group Therapy Note    Facilitator ran activity allowing participants to [employer]" of difficult/unwanted concepts in their life using a \"kiel it bucket. \"  Members were instructed to write down the feelings/people/things they wish to release, roll paper into a ball, and toss it in the bucket. Group was able to identify and process their uncomfortable situations and \"throw them away. \"  The second prompt to toss in the bucket involved writing what they are giving themselves permission to do, regardless of current life circumstances. Attendees: 10       Notes:  Pt was an active participant throughout intervention. Pt was able to identify multiple responses to both prompts, collaborated with peers in exploration of things they are letting go of, as well as things they are giving themselves permission to do. Expressed plan to continue utilizing \"kiel it\" mentality through weekend until next day of treatment, 6/5/23. Status After Intervention:  Improved    Participation Level:  Active Listener and Interactive    Participation Quality: Sharing and Supportive      Speech:  normal      Thought Process/Content: Logical      Affective Functioning: Congruent      Mood: euthymic      Level of consciousness:  Alert and Oriented x4      Response to Learning: Able to verbalize current knowledge/experience, Able to verbalize/acknowledge new learning, Capable of insight, and Progressing to goal      Endings: None Reported    Modes of Intervention: Support, Socialization, Exploration, Problem-solving, Activity, and Media      Discipline Responsible: Psychoeducational Specialist      Signature:  Kenia Riley MM, MT-BC

## 2023-06-02 NOTE — GROUP NOTE
Group Therapy Note    Date: 6/2/2023    Group Start Time: 10:00 AM  Group End Time: 11:00 AM  Group Topic: Psychoeducation    PAUL Whiting        Group Therapy Note  Clinician engaged the group members in a processing group of filling in the blanks: I fear, I feel, I want, I need, I hope, I love and I dream. Patients completed statements on each page with different colored markers. Patients were able to find commonalities and differences of perspectives when discussing each one. They also found that they only looked for commonalities and differences on the negative thoughts. Clinician had them do a search for positive perspectives and members were able to find many commonalities and differences only when it was pointed out. They understood that even in a group activity, the entire group focused on the negativity. Attendees: 10       Patient's Goal:      Notes:  Patient was cooperative and fully engaged in the group activity. Patient was able to write responses on all of the sheets. Patient participated in the discussion with commonalities and differences. Status After Intervention:  Improved    Participation Level:  Active Listener and Interactive    Participation Quality: Appropriate, Attentive, Sharing, and Supportive      Speech:  normal      Thought Process/Content: Linear      Affective Functioning: Congruent      Mood: anxious      Level of consciousness:  Attentive      Response to Learning: Able to verbalize/acknowledge new learning      Endings: None Reported    Modes of Intervention: Education, Support, Exploration, and Activity      Discipline Responsible: /Counselor      Signature:  PAUL Alfaro

## 2023-06-05 ENCOUNTER — HOSPITAL ENCOUNTER (OUTPATIENT)
Dept: PSYCHIATRY | Age: 23
Setting detail: THERAPIES SERIES
Discharge: HOME | End: 2023-06-05
Payer: MEDICAID

## 2023-06-05 VITALS
DIASTOLIC BLOOD PRESSURE: 102 MMHG | OXYGEN SATURATION: 98 % | HEART RATE: 103 BPM | TEMPERATURE: 97.4 F | SYSTOLIC BLOOD PRESSURE: 141 MMHG | RESPIRATION RATE: 17 BRPM

## 2023-06-05 DIAGNOSIS — F33.2 SEVERE EPISODE OF RECURRENT MAJOR DEPRESSIVE DISORDER, WITHOUT PSYCHOTIC FEATURES (HCC): Primary | ICD-10-CM

## 2023-06-05 PROCEDURE — 90853 GROUP PSYCHOTHERAPY: CPT

## 2023-06-05 PROCEDURE — 99215 OFFICE O/P EST HI 40 MIN: CPT | Performed by: PSYCHIATRY & NEUROLOGY

## 2023-06-05 NOTE — GROUP NOTE
Group Therapy Note    Date: 6/5/2023    Group Start Time: 11:15 AM  Group End Time: 12:15 PM  Group Topic: Recreational    [facility]    PAUL Mcmahan; Cristóbal Caballero        Group Therapy Note    Attendees: 8    Group members participated in a creative expression activity involving drawing an analogy where their \"yard\" includes their personal values to keep inside and a \"fence\" around the yard to depict their boundaries to protect said values. Participants were encouraged to focus on the positive values and instructed to use their creativity whether that involved different words, symbols, images, or colors. Group process following conclusion of intervention. Notes: Active participant across time allotted for creative expression. Used intervention to actively reflect their personal values and boundaries as well as what they will no longer allow in their \"yard. \"  Shared drawing with peers at conclusion of group when prompted. Status After Intervention:  Unchanged    Participation Level:  Active Listener and Interactive    Participation Quality: Appropriate and Sharing      Speech:  normal      Thought Process/Content: Logical      Affective Functioning: Flat      Mood: euthymic      Level of consciousness:  Alert and Attentive      Response to Learning: Able to verbalize current knowledge/experience and Progressing to goal      Endings: None Reported    Modes of Intervention: Support, Socialization, Exploration, Clarifying, Activity, and Limit-setting      Discipline Responsible: Psychoeducational Specialist and Recreational Therapist      Signature:  Demetrio Harrison MA, [address]
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
## 2023-06-05 NOTE — GROUP NOTE
Group Therapy Note    Date: 6/5/2023    Group Start Time: 10:00 AM  Group End Time: 11:00 AM  Group Topic: Psychoeducation    [facility]    Corinne Ponto, MSW        Group Therapy Note  Clinician encouraged the group members to identify their core values and strengths to help them set specific boundaries to support the core values. Group members were able to verbalize core values and give examples of a boundary they needed to set. Clinician and recreation therapist role played scenarios of how to set the healthy boundaries. Clinician passed out handouts for healthy boundary and unhealthy boundaries. Attendees: 7       Patient's Goal:      Notes:  Patient was cooperative and fully engaged in the group discussion and activity. Patient identified core values and discussed the boundary they needed to set. Patient was able to accept and provide feedback. Status After Intervention:  Improved    Participation Level:  Active Listener and Interactive    Participation Quality: Appropriate, Attentive, Sharing, and Supportive      Speech:  normal      Thought Process/Content: Logical      Affective Functioning: Congruent      Mood: euthymic      Level of consciousness:  Attentive      Response to Learning: Able to verbalize current knowledge/experience      Endings: None Reported    Modes of Intervention: Education, Support, Exploration, and Activity      Discipline Responsible: /Counselor      Signature:  Corinne Ponto, MSW

## 2023-06-06 ENCOUNTER — HOSPITAL ENCOUNTER (EMERGENCY)
Age: 23
Discharge: HOME | End: 2023-06-06
Attending: STUDENT IN AN ORGANIZED HEALTH CARE EDUCATION/TRAINING PROGRAM
Payer: MEDICAID

## 2023-06-06 VITALS
SYSTOLIC BLOOD PRESSURE: 116 MMHG | DIASTOLIC BLOOD PRESSURE: 78 MMHG | HEART RATE: 93 BPM | TEMPERATURE: 98.3 F | HEIGHT: 64 IN | BODY MASS INDEX: 22.71 KG/M2 | RESPIRATION RATE: 15 BRPM | WEIGHT: 133 LBS | OXYGEN SATURATION: 97 %

## 2023-06-06 DIAGNOSIS — F10.10 ALCOHOL ABUSE: ICD-10-CM

## 2023-06-06 DIAGNOSIS — F12.10 CANNABIS ABUSE: ICD-10-CM

## 2023-06-06 DIAGNOSIS — Z72.89 DELIBERATE SELF-CUTTING: Primary | ICD-10-CM

## 2023-06-06 LAB
ALBUMIN SERPL-MCNC: 4.5 G/DL (ref 3.4–5)
ALBUMIN/GLOB SERPL: 1.3 {RATIO} (ref 1.1–2.2)
ALP SERPL-CCNC: 57 U/L (ref 40–129)
ALT SERPL-CCNC: 19 U/L (ref 10–40)
AMPHETAMINES UR QL SCN>1000 NG/ML: ABNORMAL
ANION GAP SERPL CALCULATED.3IONS-SCNC: 13 MMOL/L (ref 3–16)
APAP SERPL-MCNC: <5 UG/ML (ref 10–30)
AST SERPL-CCNC: 24 U/L (ref 15–37)
BARBITURATES UR QL SCN>200 NG/ML: ABNORMAL
BASOPHILS # BLD: 0 K/UL (ref 0–0.2)
BASOPHILS NFR BLD: 0.2 %
BENZODIAZ UR QL SCN>200 NG/ML: ABNORMAL
BILIRUB SERPL-MCNC: <0.2 MG/DL (ref 0–1)
BUN SERPL-MCNC: 12 MG/DL (ref 7–20)
CALCIUM SERPL-MCNC: 9.4 MG/DL (ref 8.3–10.6)
CANNABINOIDS UR QL SCN>50 NG/ML: POSITIVE
CHLORIDE SERPL-SCNC: 107 MMOL/L (ref 99–110)
CO2 SERPL-SCNC: 23 MMOL/L (ref 21–32)
COCAINE UR QL SCN: ABNORMAL
CREAT SERPL-MCNC: 0.7 MG/DL (ref 0.6–1.1)
DEPRECATED RDW RBC AUTO: 13.8 % (ref 12.4–15.4)
DRUG SCREEN COMMENT UR-IMP: ABNORMAL
EOSINOPHIL # BLD: 0.1 K/UL (ref 0–0.6)
EOSINOPHIL NFR BLD: 1.5 %
ETHANOLAMINE SERPL-MCNC: 265 MG/DL (ref 0–0.08)
ETHANOLAMINE SERPL-MCNC: 68 MG/DL (ref 0–0.08)
FENTANYL SCREEN, URINE: ABNORMAL
GFR SERPLBLD CREATININE-BSD FMLA CKD-EPI: >60 ML/MIN/{1.73_M2}
GLUCOSE SERPL-MCNC: 105 MG/DL (ref 70–99)
HCG UR QL: NEGATIVE
HCT VFR BLD AUTO: 42.4 % (ref 36–48)
HGB BLD-MCNC: 14.4 G/DL (ref 12–16)
LYMPHOCYTES # BLD: 1.7 K/UL (ref 1–5.1)
LYMPHOCYTES NFR BLD: 29 %
MCH RBC QN AUTO: 31.9 PG (ref 26–34)
MCHC RBC AUTO-ENTMCNC: 34 G/DL (ref 31–36)
MCV RBC AUTO: 94 FL (ref 80–100)
METHADONE UR QL SCN>300 NG/ML: ABNORMAL
MONOCYTES # BLD: 0.6 K/UL (ref 0–1.3)
MONOCYTES NFR BLD: 10.4 %
NEUTROPHILS # BLD: 3.5 K/UL (ref 1.7–7.7)
NEUTROPHILS NFR BLD: 58.9 %
OPIATES UR QL SCN>300 NG/ML: ABNORMAL
OXYCODONE UR QL SCN: ABNORMAL
PCP UR QL SCN>25 NG/ML: ABNORMAL
PH UR STRIP: 6 [PH]
PLATELET # BLD AUTO: 235 K/UL (ref 135–450)
PMV BLD AUTO: 7.6 FL (ref 5–10.5)
POTASSIUM SERPL-SCNC: 3.8 MMOL/L (ref 3.5–5.1)
PROT SERPL-MCNC: 8.1 G/DL (ref 6.4–8.2)
RBC # BLD AUTO: 4.5 M/UL (ref 4–5.2)
SALICYLATES SERPL-MCNC: <0.3 MG/DL (ref 15–30)
SODIUM SERPL-SCNC: 143 MMOL/L (ref 136–145)
WBC # BLD AUTO: 5.9 K/UL (ref 4–11)

## 2023-06-06 PROCEDURE — 82077 ASSAY SPEC XCP UR&BREATH IA: CPT

## 2023-06-06 PROCEDURE — 80053 COMPREHEN METABOLIC PANEL: CPT

## 2023-06-06 PROCEDURE — 90715 TDAP VACCINE 7 YRS/> IM: CPT | Performed by: STUDENT IN AN ORGANIZED HEALTH CARE EDUCATION/TRAINING PROGRAM

## 2023-06-06 PROCEDURE — 80307 DRUG TEST PRSMV CHEM ANLYZR: CPT

## 2023-06-06 PROCEDURE — 85025 COMPLETE CBC W/AUTO DIFF WBC: CPT

## 2023-06-06 PROCEDURE — 6360000002 HC RX W HCPCS: Performed by: STUDENT IN AN ORGANIZED HEALTH CARE EDUCATION/TRAINING PROGRAM

## 2023-06-06 PROCEDURE — 90471 IMMUNIZATION ADMIN: CPT | Performed by: STUDENT IN AN ORGANIZED HEALTH CARE EDUCATION/TRAINING PROGRAM

## 2023-06-06 PROCEDURE — 36415 COLL VENOUS BLD VENIPUNCTURE: CPT

## 2023-06-06 PROCEDURE — 84703 CHORIONIC GONADOTROPIN ASSAY: CPT

## 2023-06-06 PROCEDURE — 80179 DRUG ASSAY SALICYLATE: CPT

## 2023-06-06 PROCEDURE — 80143 DRUG ASSAY ACETAMINOPHEN: CPT

## 2023-06-06 RX ORDER — LORAZEPAM 2 MG/ML
2 INJECTION INTRAMUSCULAR ONCE
Status: COMPLETED | OUTPATIENT
Start: 2023-06-06 | End: 2023-06-06

## 2023-06-06 RX ORDER — ONDANSETRON 2 MG/ML
INJECTION INTRAMUSCULAR; INTRAVENOUS
Status: DISPENSED
Start: 2023-06-06 | End: 2023-06-06

## 2023-06-06 RX ORDER — MORPHINE SULFATE 4 MG/ML
INJECTION, SOLUTION INTRAMUSCULAR; INTRAVENOUS
Status: DISPENSED
Start: 2023-06-06 | End: 2023-06-06

## 2023-06-06 RX ORDER — DROPERIDOL 2.5 MG/ML
2.5 INJECTION, SOLUTION INTRAMUSCULAR; INTRAVENOUS ONCE
Status: COMPLETED | OUTPATIENT
Start: 2023-06-06 | End: 2023-06-06

## 2023-06-06 RX ORDER — DIPHENHYDRAMINE HYDROCHLORIDE 50 MG/ML
50 INJECTION INTRAMUSCULAR; INTRAVENOUS ONCE
Status: COMPLETED | OUTPATIENT
Start: 2023-06-06 | End: 2023-06-06

## 2023-06-06 RX ADMIN — LORAZEPAM 2 MG: 2 INJECTION INTRAMUSCULAR; INTRAVENOUS at 03:42

## 2023-06-06 RX ADMIN — DIPHENHYDRAMINE HYDROCHLORIDE 50 MG: 50 INJECTION, SOLUTION INTRAMUSCULAR; INTRAVENOUS at 03:43

## 2023-06-06 RX ADMIN — DROPERIDOL 2.5 MG: 2.5 INJECTION, SOLUTION INTRAMUSCULAR; INTRAVENOUS at 03:42

## 2023-06-06 RX ADMIN — TETANUS TOXOID, REDUCED DIPHTHERIA TOXOID AND ACELLULAR PERTUSSIS VACCINE, ADSORBED 0.5 ML: 5; 2.5; 8; 8; 2.5 SUSPENSION INTRAMUSCULAR at 04:25

## 2023-06-06 ASSESSMENT — LIFESTYLE VARIABLES
HOW OFTEN DO YOU HAVE A DRINK CONTAINING ALCOHOL: 2-4 TIMES A MONTH
HOW MANY STANDARD DRINKS CONTAINING ALCOHOL DO YOU HAVE ON A TYPICAL DAY: 1 OR 2

## 2023-06-06 ASSESSMENT — PAIN - FUNCTIONAL ASSESSMENT: PAIN_FUNCTIONAL_ASSESSMENT: NONE - DENIES PAIN

## 2023-06-06 NOTE — PROGRESS NOTES
Restraint 1 Hour RN assessment      Emilio Kincaid was belligerent and combative AEB kicking and hitting, screaming at staff, calling them names. Emilio Kincaid was placed in 4 point leather restraints at 0320 due to Behavioral management problems. Currently patient is thrashing and yelling and has reacted poorly to being placed in restraints. Patient medical history includes       Diagnosis Date    Chlamydia     during current pregnancy    Depression     Major depressive disorder- weaned off meds    Headache syndromes 03/03/2010    migraine    In restraints. Current mental status awake and alert; oriented to person, place, and time. At this time the patient  does not meet criteria for continued restraint AEB Attention Seeking, Agitated, Combative, and Disruptive behavior. The room has been assessed for safety and potentially harmful objects. Patient has been assessed for comfort and current needs. Respirations regular and easy. Skin Skin color, tempature, turgor normal. No rashes or lesions. Superficial cuts on upper arms and thighs. Current vitals include /70   Pulse (!) 103   Temp 98.3 °F (36.8 °C) (Oral)   Resp 16   Ht 5' 4\" (1.626 m)   Wt 133 lb (60.3 kg)   LMP 05/09/2023   SpO2 96%   BMI 22.83 kg/m²   .     Most recent lab values include:   Admission on 06/06/2023   Component Date Value Ref Range Status    Acetaminophen Level 06/06/2023 <5 (L)  10 - 30 ug/mL Final    Comment: Therapeutic Range: 10.0-30.0 ug/mL  Toxic: >=150 ug/mL      WBC 06/06/2023 5.9  4.0 - 11.0 K/uL Final    RBC 06/06/2023 4.50  4.00 - 5.20 M/uL Final    Hemoglobin 06/06/2023 14.4  12.0 - 16.0 g/dL Final    Hematocrit 06/06/2023 42.4  36.0 - 48.0 % Final    MCV 06/06/2023 94.0  80.0 - 100.0 fL Final    MCH 06/06/2023 31.9  26.0 - 34.0 pg Final    MCHC 06/06/2023 34.0  31.0 - 36.0 g/dL Final    RDW 06/06/2023 13.8  12.4 - 15.4 % Final    Platelets 97/44/8268 235  135 - 450 K/uL Final    MPV 06/06/2023 7.6

## 2023-06-06 NOTE — ED NOTES
Resting quietly in bed, pt arouses to verbal commands. Pt calm and cooperative c care. Pt C-SSRS screening completed at this time, pt denies to this nurse any past or present intent on killing herself. Pt does admit to cutting self but not to kill self. Pt provided with water pitcher at her request, safety breakfast tray ordered at this time. Will continue to monitor.  DANIEL Villalobos RN  06/06/23 9648

## 2023-06-06 NOTE — ED NOTES
Level of Care Disposition:  discharge    Patient was seen by ED provider and [facility] staff. The case was presented to psychiatric provider on-call MIRIAN Portillo. Based on the ED evaluation and information presented to the provider by Edmundo Hanna, it is the recommendation of the on call psychiatric provider that the patient be discharged from a psychiatric standpoint with the following referrals: outpatient referrals. RATIONALE FOR NON-ADMISSION:  The patient does not meet criteria for an involuntary psychiatric admission because she does not pose an imminent risk to her self or anyone else at this time.              Rashawn Bettencourt, DANIEL  06/06/23 7974

## 2023-06-06 NOTE — ED PROVIDER NOTES
herself but she states she was not cutting for the purpose of killing herself but rather to feel pain. I am the Primary Physician of Record.   Results for orders placed or performed during the hospital encounter of 06/06/23   Acetaminophen Level   Result Value Ref Range    Acetaminophen Level <5 (L) 10 - 30 ug/mL   CBC with Auto Differential   Result Value Ref Range    WBC 5.9 4.0 - 11.0 K/uL    RBC 4.50 4.00 - 5.20 M/uL    Hemoglobin 14.4 12.0 - 16.0 g/dL    Hematocrit 42.4 36.0 - 48.0 %    MCV 94.0 80.0 - 100.0 fL    MCH 31.9 26.0 - 34.0 pg    MCHC 34.0 31.0 - 36.0 g/dL    RDW 13.8 12.4 - 15.4 %    Platelets 104 542 - 754 K/uL    MPV 7.6 5.0 - 10.5 fL    Neutrophils % 58.9 %    Lymphocytes % 29.0 %    Monocytes % 10.4 %    Eosinophils % 1.5 %    Basophils % 0.2 %    Neutrophils Absolute 3.5 1.7 - 7.7 K/uL    Lymphocytes Absolute 1.7 1.0 - 5.1 K/uL    Monocytes Absolute 0.6 0.0 - 1.3 K/uL    Eosinophils Absolute 0.1 0.0 - 0.6 K/uL    Basophils Absolute 0.0 0.0 - 0.2 K/uL   CMP w/ Reflex to MG   Result Value Ref Range    Sodium 143 136 - 145 mmol/L    Potassium reflex Magnesium 3.8 3.5 - 5.1 mmol/L    Chloride 107 99 - 110 mmol/L    CO2 23 21 - 32 mmol/L    Anion Gap 13 3 - 16    Glucose 105 (H) 70 - 99 mg/dL    BUN 12 7 - 20 mg/dL    Creatinine 0.7 0.6 - 1.1 mg/dL    Est, Glom Filt Rate >60 >60    Calcium 9.4 8.3 - 10.6 mg/dL    Total Protein 8.1 6.4 - 8.2 g/dL    Albumin 4.5 3.4 - 5.0 g/dL    Albumin/Globulin Ratio 1.3 1.1 - 2.2    Total Bilirubin <0.2 0.0 - 1.0 mg/dL    Alkaline Phosphatase 57 40 - 129 U/L    ALT 19 10 - 40 U/L    AST 24 15 - 37 U/L   Ethanol   Result Value Ref Range    Ethanol Lvl 020 mg/dL   Salicylate   Result Value Ref Range    Salicylate, Serum <6.4 (L) 15.0 - 30.0 mg/dL   Urine Drug Screen   Result Value Ref Range    Amphetamine Screen, Urine Neg Negative <1000ng/mL    Barbiturate Screen, Ur Neg Negative <200 ng/mL    Benzodiazepine Screen, Urine Neg Negative <200 ng/mL    Cannabinoid
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
## 2023-06-06 NOTE — ED NOTES
Per Nedra Zendejas the psych nurse she is cleared. No longer needs a sitter. Dr Israel Brito informed.       Adarsh Burdick, RN  06/06/23 5816

## 2023-06-06 NOTE — ED NOTES
Collateral Contact:  Name: Kaushik Miller   Phone:[phone]  Relation to Patient: Mother   Last Contact with Patient: Clementina Ruiz  Concerns:     Rachael reports that the patient lost her apartment in March and now lives next door to her with her grandmother. Rachael has custody of her 10 yo child, who is currently visiting her bio father. Pt is not currently working. She does drink alcohol frequently. Rachael reports that she believes that the pts depression has gotten worse with starting the meds she gets from IOP. Rachael believe that the patient is taking her meds as prescribed but isn't sure. She know the patient gave the klonopin to the her gma to hold and she gives her one every night. Pt does have a history of self harm. When ask if she believes pt would be safe if discharged, she wouldn't didn't reply.       Autumn Hagen RN  06/06/23 1952

## 2023-06-06 NOTE — DISCHARGE INSTRUCTIONS
You are being referred to contact the [facility] for peer consultation at [phone]. The [employer] [facility] is a peer-operated call in service for individuals who need supportive assistance in their recovery from mental health and/or addiction issues. They offer low-level crisis, help with daily living activities and peer support. You can call when   - feeling lonely  -feeling anxious  -are bored  -wanting community resources  -have questions  -want to talk about your day     The Warm line runs 24-7, 365 day a year. The trained peer supporters are ready to   - be an understanding eat when the caller needs someone to listen. - share when appropriate, what they have learned in their life experiences. - keep your conversations confidential.  - be a stepping stone for individuals with lived experience to get into the work force.       DRUG TREATMENT    [facility] HOTLINE (Substance abuse and mental health services administration)     [address]                                                (IN/OUT)                                                       [address]          ([place] [address])                                                                [address]                                          (IN/OUT)  [phone]    [facility]      [phone]    FAX                        (IN/OUT)                                                       [phone]    [facility] [address] [facility] (IN/OUT)                                                       [phone]    FAX  [phone]    [address]    [address] [facility]          ([address] [facility])       [place] [address]    [facility]      (OUT)       [phone]    [facility]                      (IN/OUT)       [facility] Palumbo        (OUT)

## 2023-06-06 NOTE — ED NOTES
Pt walked out to nurses station and asked to walk around the halls. I informed pt that it was not safe to walk d/t risk of falls and asked her go to her room. Pt became verbally aggressive and was shouting at staff calling the nurses, \"bitches. \" Security and Dr. Jordana Conrad at bedside. Pt became more aggressive and began swinging and scratching staff members. Pt placed in 4 point restraints and medicated for safety. Orders placed and sitter at bedside.       Neela Quesada RN  06/06/23 7779

## 2023-06-06 NOTE — ED NOTES
.Presenting Problem:Patient presents to the ED on a SOB   after cutting herself while being intoxicated. Pt states she dose not remember anything about coming to the hospital last night. She didn't think she drank that much. She does remember drinking and cutting herself. She denies SI. She admits she is going through a lot. She states, \"I just want someone to hug me and tell me everything will be ok, but all I get is lectures. \"  Pt states she only drinks 1-2 X weekly and only 1-2 shots. Appearance/Hygiene:  hospital attire, fair grooming, and fair hygiene   Motor Behavior: WNL   Attitude: cooperative  Affect:  a bit flat   Speech: normal pitch and normal volume  Mood: depressed   Thought Processes: Bowman and Logical  Perceptions: Absent   Thought content: Clear   Orientation: A&Ox4   Memory: intact  Concentration: Fair    Insight/ judgement: normal insight and judgment    Psychosocial and contextual factors: Pt state she employed, lives with [address] [place] [address]. Pt's mother has custody of her 10 yo child due to the pt's father threatening to file papers due to the patient mental health. (Bipolar)    C-SSRS flowsheet is  Complete. Psychiatric History (including current outpatient provider and past inpatient admissions): Admitted to [facility] 5/15/23-5/17/23. Pt has been attending IOP 3 X weekly since being discharged. Access to Firearms:  Denies     ASSESSMENT FOR IMMINENT FUTURE DANGER:    RISK FACTORS:    [x]  Age <25 or >49   []  Male gender   [x]  Depressed mood   []  Active suicidal ideation     []  Suicide plan     []  Suicide attempt     []  Access to lethal means     []  Prior suicide attempt     [x]  Active substance abuse alcohol. Pt states she only drinks 1-2 X weekly.   She has came to the ER intoxicated 4 X since 5/15/23   [x]  Highly impulsive behaviors     []  Not attending to self-care/ADLs     []  Recent significant loss     []  Chronic pain or medical illness    []  Social isolation     []

## 2023-06-06 NOTE — ED NOTES
Pt resting in bed with eyes closed. 4 point restraints D/C d/t pt compliance.       Thiago De La Torre RN  06/06/23 [id number]

## 2023-06-06 NOTE — ED NOTES
Sitter continues to be at bedside, patients lunch delivered via safety tray. Patient laying quietly in bed. Patient let this RN draw her ETOH level with no issues. Patient answered the C-SSRS frequent screening questions.       Elise Hughes RN  06/06/23 [address] Kay Enriquez RN  06/06/23 4834

## 2023-06-06 NOTE — ED NOTES
Patient's mother updated on plan of care per patients request. She is on her way to pick patient up.       Ty Del Cid RN  06/06/23 0590

## 2023-06-06 NOTE — ED NOTES
Patient is currently asleep in bed. Sitter continues to be at bedside with her.      Marnie Dale RN  06/06/23 1498

## 2023-06-06 NOTE — ED PROVIDER NOTES
Dry    LABS  I have reviewed all labs for this visit.    Results for orders placed or performed during the hospital encounter of 06/06/23   Acetaminophen Level   Result Value Ref Range    Acetaminophen Level <5 (L) 10 - 30 ug/mL   CBC with Auto Differential   Result Value Ref Range    WBC 5.9 4.0 - 11.0 K/uL    RBC 4.50 4.00 - 5.20 M/uL    Hemoglobin 14.4 12.0 - 16.0 g/dL    Hematocrit 42.4 36.0 - 48.0 %    MCV 94.0 80.0 - 100.0 fL    MCH 31.9 26.0 - 34.0 pg    MCHC 34.0 31.0 - 36.0 g/dL    RDW 13.8 12.4 - 15.4 %    Platelets 726 585 - 551 K/uL    MPV 7.6 5.0 - 10.5 fL    Neutrophils % 58.9 %    Lymphocytes % 29.0 %    Monocytes % 10.4 %    Eosinophils % 1.5 %    Basophils % 0.2 %    Neutrophils Absolute 3.5 1.7 - 7.7 K/uL    Lymphocytes Absolute 1.7 1.0 - 5.1 K/uL    Monocytes Absolute 0.6 0.0 - 1.3 K/uL    Eosinophils Absolute 0.1 0.0 - 0.6 K/uL    Basophils Absolute 0.0 0.0 - 0.2 K/uL   CMP w/ Reflex to MG   Result Value Ref Range    Sodium 143 136 - 145 mmol/L    Potassium reflex Magnesium 3.8 3.5 - 5.1 mmol/L    Chloride 107 99 - 110 mmol/L    CO2 23 21 - 32 mmol/L    Anion Gap 13 3 - 16    Glucose 105 (H) 70 - 99 mg/dL    BUN 12 7 - 20 mg/dL    Creatinine 0.7 0.6 - 1.1 mg/dL    Est, Glom Filt Rate >60 >60    Calcium 9.4 8.3 - 10.6 mg/dL    Total Protein 8.1 6.4 - 8.2 g/dL    Albumin 4.5 3.4 - 5.0 g/dL    Albumin/Globulin Ratio 1.3 1.1 - 2.2    Total Bilirubin <0.2 0.0 - 1.0 mg/dL    Alkaline Phosphatase 57 40 - 129 U/L    ALT 19 10 - 40 U/L    AST 24 15 - 37 U/L   Ethanol   Result Value Ref Range    Ethanol Lvl 759 mg/dL   Salicylate   Result Value Ref Range    Salicylate, Serum <2.1 (L) 15.0 - 30.0 mg/dL   Urine Drug Screen   Result Value Ref Range    Amphetamine Screen, Urine Neg Negative <1000ng/mL    Barbiturate Screen, Ur Neg Negative <200 ng/mL    Benzodiazepine Screen, Urine Neg Negative <200 ng/mL    Cannabinoid Scrn, Ur POSITIVE (A) Negative <50 ng/mL    Cocaine Metabolite Screen, Urine Neg Negative
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
## 2023-06-07 ENCOUNTER — HOSPITAL ENCOUNTER (OUTPATIENT)
Dept: PSYCHIATRY | Age: 23
Setting detail: THERAPIES SERIES
Discharge: HOME | End: 2023-06-07
Payer: MEDICAID

## 2023-06-07 DIAGNOSIS — F33.2 SEVERE EPISODE OF RECURRENT MAJOR DEPRESSIVE DISORDER, WITHOUT PSYCHOTIC FEATURES (HCC): Primary | ICD-10-CM

## 2023-06-07 PROCEDURE — 90853 GROUP PSYCHOTHERAPY: CPT

## 2023-06-07 NOTE — GROUP NOTE
Group Therapy Note    Date: 6/7/2023    Group Start Time: 10:00 AM  Group End Time: 11:00 AM  Group Topic: Psychoeducation    [facility]    PAUL Allen        Group Therapy Note  Clinician introduce the group members to the stages of change and how they can get stuck in the contemplative stage. They recognize a problem but get stuck not knowing what to do at that point. Group members defined the work motivation as the desire to change and intention as the action plan stage of change. The clinician introduced the group members to the 4 intention questions and the 4th question (what is the best thing that can happen if you make a change) and had the members write it on the back page of the WRAP sheet. The members will complete the WRAP the following group. Attendees: 8       Patient's Goal:      Notes:  Patient was cooperative and fully engaged in the group activity and discussion. Patient completed the 4 questions and shared the answers with the group. Members commented on the patient's responses with validation and support. Status After Intervention:  Improved    Participation Level:  Active Listener and Interactive    Participation Quality: Appropriate, Attentive, Sharing, and Supportive      Speech:  normal      Thought Process/Content: Logical      Affective Functioning: Congruent      Mood: depressed and fearful      Level of consciousness:  Attentive      Response to Learning: Able to retain information      Endings: None Reported    Modes of Intervention: Education, Support, Exploration, and Activity      Discipline Responsible: /Counselor      Signature:  PAUL Allen

## 2023-06-07 NOTE — GROUP NOTE
Group Therapy Note    Date: 6/7/2023    Group Start Time: 11:15 AM  Group End Time: 12:15 PM  Group Topic: Music Therapy    [facility]    Rey Wardx        Group Therapy Note    Group members were provided opportunity to engage in [employer]" intervention, exploring their relationship with music in coping, expression of self, escapism, and memory recall. Group members identified 3 songs that elicit specific feelings (relaxed, worthy, understood). Songs were played at random while group members discussed their responses to music, identifying group members who selected them. Attendees: 8       Notes:  Pt present and actively engaged across group discussions and interventions. No needs verbalized at conclusion of session. Status After Intervention:  Improved    Participation Level:  Active Listener and Interactive    Participation Quality: Appropriate, Attentive, Sharing, and Supportive      Speech:  normal      Thought Process/Content: Logical      Affective Functioning: Congruent      Mood: euthymic      Level of consciousness:  Alert and Attentive      Response to Learning: Capable of insight and Progressing to goal      Endings: None Reported    Modes of Intervention: Education, Support, Socialization, Exploration, Activity, and Media      Discipline Responsible: Psychoeducational Specialist      Signature:  Dahlia Short, MM, MT-BC

## 2023-06-07 NOTE — BH NOTE
Outpatient Treatment Team Progress Note  Date of Review: 6/7/2023      Multidisciplinary Outpatient Treatment Team met to discuss and review patient's progress in group and individual therapy sessions. 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     Treatment Start Date: 5/22/2023     Treatment Step-Down Date: n/a    Tentative Discharge Date: 7/3/23

## 2023-06-09 ENCOUNTER — HOSPITAL ENCOUNTER (OUTPATIENT)
Dept: PSYCHIATRY | Age: 23
Setting detail: THERAPIES SERIES
Discharge: HOME | End: 2023-06-09
Payer: MEDICAID

## 2023-06-09 DIAGNOSIS — F33.2 SEVERE EPISODE OF RECURRENT MAJOR DEPRESSIVE DISORDER, WITHOUT PSYCHOTIC FEATURES (HCC): Primary | ICD-10-CM

## 2023-06-09 PROCEDURE — 90853 GROUP PSYCHOTHERAPY: CPT

## 2023-06-09 NOTE — GROUP NOTE
Group Therapy Note    Date: 6/9/2023    Group Start Time: 11:15 AM  Group End Time: 12:15 PM  Group Topic: Psychoeducation    [facility]    PAUL Hall        Group Therapy Note  Clinician introduce the group members to cognitive distractions. Group members discussed each one and related to each other. The group members made the connection that the distortions were all about them trying to control situations. The clinician introduced them to gratitude to counter the need for control. Group members were able to make a long list of coping skills to use for their toolkit. Attendees: 9       Patient's Goal:      Notes:  Patient was cooperative and fully engaged in the group discussion and activity. Patient shared the cognitive distortions they related to and how they felt about it all going back to being in control. Patient was able to accept gratitude is being able to let go of the control. Patient participated in making a list of coping skills to list on the WRAP workbook. Status After Intervention:  Improved    Participation Level:  Active Listener and Interactive    Participation Quality: Appropriate, Attentive, Sharing, and Supportive      Speech:  normal      Thought Process/Content: Logical      Affective Functioning: Congruent      Mood: anxious and fearful      Level of consciousness:  Attentive      Response to Learning: Able to verbalize current knowledge/experience      Endings: None Reported    Modes of Intervention: Education, Support, Exploration, and Activity      Discipline Responsible: /Counselor      Signature:  PAUL Hall

## 2023-06-09 NOTE — GROUP NOTE
Group Therapy Note    Date: 6/9/2023    Group Start Time:  8:30 AM  Group End Time:  9:45 AM  Group Topic: Psychotherapy    [facility]    PAUL Ba        Group Therapy Note  Group members engaged in psychotherapy agenda group. Group members identified their individual agendas/goals and utilized the duration of group time to process, give, and receive feedback related to progress of goals. Focus was placed on remaining in the here and now in order to apply feedback learned in group to actual life circumstances and situations. Attendees: 9       Patient's Goal:  Patient shared \"with the group that she met with her  and his wife and they prayed for her to be able to get some sleep. She has not had any nightmares in the last two nights and no night sweats. \"    Notes:  Patient shared that she is struggling with the abandonment fears like the rest of the group members. Clinician had every group member share what helps them get though these feelings in a health manner. Every group members shared their coping skills and a large list of coping skills was created for the members to try implementing. Member stated they can use the list on their WRAP plan in their toolkit. Status After Intervention:  Improved    Participation Level:  Active Listener and Interactive    Participation Quality: Appropriate, Attentive, Sharing, and Supportive      Speech:  normal      Thought Process/Content: Logical      Affective Functioning: Congruent      Mood: anxious and fearful      Level of consciousness:  Attentive      Response to Learning: Able to verbalize current knowledge/experience      Endings: None Reported    Modes of Intervention: Support and Exploration      Discipline Responsible: /Counselor      Signature:  PAUL Ba

## 2023-06-09 NOTE — GROUP NOTE
Group Therapy Note    Date: 6/9/2023    Group Start Time: 10:00 AM  Group End Time: 11:00 AM  Group Topic: Psychoeducation    THERESA GAVIN I    [address] [facility]        Group Therapy Note    Attendees: 9    Facilitator led a psychoeducation group that focused on how to fill out outpatient daily check-in sheets. Patients explored the rational for utilizing the form, the language used in the form and clarified the meaning of questions, and discussed how to use the check-in as a measure of treatment progress. Patient discussed mindfulness of baseline and intentions, particularly when answering screening questions. Notes:  Diana Ash was actively engaged in discussion and was attentive during educational presentation. iDana Ash reported that the session was informative and helped them understand how to complete the check-in form to be more thorough and accurate. Status After Intervention:  Improved    Participation Level:  Active Listener and Interactive    Participation Quality: Appropriate, Attentive, and Sharing      Speech:  normal      Thought Process/Content: Logical  Linear      Affective Functioning: Congruent      Mood: euthymic      Level of consciousness:  Alert, Oriented x4, and Attentive      Response to Learning: Able to verbalize current knowledge/experience, Able to verbalize/acknowledge new learning, Able to retain information, Capable of insight, Able to change behavior, and Progressing to goal      Endings: None Reported    Modes of Intervention: Education and Clarifying      Discipline Responsible: /Counselor      Signature:  [facility]

## 2023-06-19 ENCOUNTER — HOSPITAL ENCOUNTER (OUTPATIENT)
Dept: PSYCHIATRY | Age: 23
Setting detail: THERAPIES SERIES
Discharge: HOME | End: 2023-06-19
Payer: MEDICAID
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
## 2023-06-21 ENCOUNTER — APPOINTMENT (OUTPATIENT)
Dept: PSYCHIATRY | Age: 23
End: 2023-06-21
Payer: MEDICAID

## 2023-06-23 ENCOUNTER — APPOINTMENT (OUTPATIENT)
Dept: PSYCHIATRY | Age: 23
End: 2023-06-23
Payer: MEDICAID

## 2023-06-26 ENCOUNTER — APPOINTMENT (OUTPATIENT)
Dept: PSYCHIATRY | Age: 23
End: 2023-06-26
Payer: MEDICAID

## 2023-06-28 ENCOUNTER — APPOINTMENT (OUTPATIENT)
Dept: PSYCHIATRY | Age: 23
End: 2023-06-28
Payer: MEDICAID

## 2023-06-30 ENCOUNTER — APPOINTMENT (OUTPATIENT)
Dept: PSYCHIATRY | Age: 23
End: 2023-06-30
Payer: MEDICAID

## 2024-01-30 ENCOUNTER — HOSPITAL ENCOUNTER (OUTPATIENT)
Dept: GENERAL RADIOLOGY | Age: 24
Discharge: HOME | End: 2024-01-30
Payer: MEDICAID

## 2024-01-30 ENCOUNTER — HOSPITAL ENCOUNTER (OUTPATIENT)
Age: 24
Discharge: HOME | End: 2024-01-30
Payer: MEDICAID

## 2024-01-30 DIAGNOSIS — R52 PAIN: ICD-10-CM

## 2024-01-30 PROCEDURE — 72072 X-RAY EXAM THORAC SPINE 3VWS: CPT

## 2024-01-30 PROCEDURE — 72100 X-RAY EXAM L-S SPINE 2/3 VWS: CPT
